# Patient Record
Sex: FEMALE | Race: WHITE | Employment: OTHER | URBAN - METROPOLITAN AREA
[De-identification: names, ages, dates, MRNs, and addresses within clinical notes are randomized per-mention and may not be internally consistent; named-entity substitution may affect disease eponyms.]

---

## 2022-03-18 PROBLEM — G47.33 OSA (OBSTRUCTIVE SLEEP APNEA): Status: ACTIVE | Noted: 2020-09-21

## 2022-03-18 PROBLEM — E78.49 OTHER HYPERLIPIDEMIA: Status: ACTIVE | Noted: 2020-09-21

## 2022-03-19 PROBLEM — R06.02 SOB (SHORTNESS OF BREATH): Status: ACTIVE | Noted: 2020-08-19

## 2022-03-19 PROBLEM — Z82.49 FAMILY HISTORY OF PREMATURE CAD: Status: ACTIVE | Noted: 2020-09-21

## 2022-03-19 PROBLEM — E66.01 SEVERE OBESITY (HCC): Status: ACTIVE | Noted: 2019-01-28

## 2023-06-27 ENCOUNTER — TELEPHONE (OUTPATIENT)
Age: 78
End: 2023-06-27

## 2023-06-27 NOTE — TELEPHONE ENCOUNTER
Patient states she thought a MRI/ CT order was going to be entered for her after her visit on 6/22/23, and that she just wanted to know if it is still needed. Patient can be reached at [phone].

## 2023-06-27 NOTE — TELEPHONE ENCOUNTER
LVM for the patient letting her know that insurance wants the PT completed before any type of imaging order is signed to complete.

## 2023-07-13 ENCOUNTER — HOSPITAL ENCOUNTER (OUTPATIENT)
Facility: HOSPITAL | Age: 78
Setting detail: RECURRING SERIES
Discharge: HOME | End: 2023-07-16
Payer: MEDICARE

## 2023-07-13 PROCEDURE — 97110 THERAPEUTIC EXERCISES: CPT

## 2023-07-13 PROCEDURE — 97162 PT EVAL MOD COMPLEX 30 MIN: CPT

## 2023-07-24 ENCOUNTER — TRANSCRIBE ORDERS (OUTPATIENT)
Facility: HOSPITAL | Age: 78
End: 2023-07-24

## 2023-07-24 DIAGNOSIS — Z12.31 VISIT FOR SCREENING MAMMOGRAM: Primary | ICD-10-CM

## 2023-07-26 ENCOUNTER — APPOINTMENT (OUTPATIENT)
Facility: HOSPITAL | Age: 78
End: 2023-07-26
Payer: MEDICARE

## 2023-07-28 NOTE — PROGRESS NOTES
PHYSICAL / OCCUPATIONAL THERAPY - DAILY TREATMENT NOTE (updated )    Patient Name: Tasha Hung    Date: 2023    : 1945  Insurance: Payor: MEDICARE / Plan: MEDICARE PART A AND B / Product Type: *No Product type* /      Patient  verified Yes     Visit #   Current / Total 2 24   Time   In / Out 147 239   Pain   In / Out 7 knees 7   Subjective Functional Status/Changes: I\"m doing alright. Changes to:  Meds, Allergies, Med Hx, Sx Hx? If yes, update Summary List no       TREATMENT AREA =  Other low back pain [M54.59]    OBJECTIVE    Modalities Rationale:     decrease pain to improve patient's ability to progress to PLOF and address remaining functional goals. min [] Estim Unattended, type/location:                                      []  w/ice    []  w/heat    min [] Estim Attended, type/location:                                     []  w/US     []  w/ice    []  w/heat    []  TENS insruct      min []  Mechanical Traction: type/lbs                   []  pro   []  sup   []  int   []  cont    []  before manual    []  after manual    min []  Ultrasound, settings/location:      min []  Iontophoresis w/ dexamethasone, location:                                               []  take home patch       []  in clinic   10     min  unbilled []  Ice     [x]  Heat    location/position: Supine with wedge; L/s and B knees    min []  Paraffin,  details:     min []  Vasopneumatic Device, press/temp:     min []  Addi Eladio / Ivana Pritchett: If using vaso (only need to measure limb vaso being performed on)      pre-treatment girth :       post-treatment girth :       measured at (landmark location) :      min []  Other:    Skin assessment post-treatment (if applicable):    []  intact    []  redness- no adverse reaction                 []redness - adverse reaction:         Therapeutic Procedures:   Tx Min Billable or 1:1 Min (if diff from Tx Min) Procedure, Rationale, Specifics   [id number] Therapeutic Exercise

## 2023-07-31 ENCOUNTER — HOSPITAL ENCOUNTER (OUTPATIENT)
Facility: HOSPITAL | Age: 78
Setting detail: RECURRING SERIES
Discharge: HOME | End: 2023-08-03
Payer: MEDICARE

## 2023-07-31 PROCEDURE — 97110 THERAPEUTIC EXERCISES: CPT

## 2023-07-31 PROCEDURE — 97112 NEUROMUSCULAR REEDUCATION: CPT

## 2023-08-02 ENCOUNTER — HOSPITAL ENCOUNTER (OUTPATIENT)
Facility: HOSPITAL | Age: 78
Setting detail: RECURRING SERIES
End: 2023-08-02
Payer: MEDICARE

## 2023-08-02 ENCOUNTER — TELEPHONE (OUTPATIENT)
Facility: HOSPITAL | Age: 78
End: 2023-08-02

## 2023-08-07 ENCOUNTER — HOSPITAL ENCOUNTER (OUTPATIENT)
Facility: HOSPITAL | Age: 78
Setting detail: RECURRING SERIES
Discharge: HOME | End: 2023-08-10
Payer: MEDICARE

## 2023-08-07 PROCEDURE — 97112 NEUROMUSCULAR REEDUCATION: CPT

## 2023-08-07 PROCEDURE — 97110 THERAPEUTIC EXERCISES: CPT

## 2023-08-07 NOTE — PROGRESS NOTES
97140 Therapeutic Exercise (timed):  increase ROM, strength, coordination, balance, and proprioception to improve patient's ability to progress to PLOF and address remaining functional goals. (see flow sheet as applicable)     Details if applicable:       8  45869 Neuromuscular Re-Education (timed):  improve balance, coordination, kinesthetic sense, posture, core stability and proprioception to improve patient's ability to develop conscious control of individual muscles and awareness of position of extremities in order to progress to PLOF and address remaining functional goals. (see flow sheet as applicable)     Details if applicable:            Details if applicable:            Details if applicable:            Details if applicable:     39  [facility] Totals Reminder: bill using total billable min of TIMED therapeutic procedures (example: do not include dry needle or estim unattended, both untimed codes, in totals to left)  8-22 min = 1 unit; 23-37 min = 2 units; 38-52 min = 3 units; 53-67 min = 4 units; 68-82 min = 5 units   Total Total     TOTAL TREATMENT TIME:        55     [x]  Patient Education billed concurrently with other procedures   [x] Review HEP    [] Progressed/Changed HEP, detail:    [] Other detail:       Objective Information/Functional Measures/Assessment  Minor cramping in the B HS's with supine marching that eased with a rest break. The pt was able to perform therex as prescribed but does note an increase in L/s pain post treatment. The pt left in no apparent distress.       Patient will continue to benefit from skilled PT / OT services to modify and progress therapeutic interventions, analyze and address functional mobility deficits, analyze and address ROM deficits, analyze and address strength deficits, analyze and address soft tissue restrictions, analyze and cue for proper movement patterns, and analyze and modify for postural abnormalities to address functional deficits and attain remaining

## 2023-08-09 ENCOUNTER — APPOINTMENT (OUTPATIENT)
Facility: HOSPITAL | Age: 78
End: 2023-08-09
Payer: MEDICARE

## 2023-08-09 ENCOUNTER — TELEPHONE (OUTPATIENT)
Facility: HOSPITAL | Age: 78
End: 2023-08-09

## 2023-08-14 ENCOUNTER — HOSPITAL ENCOUNTER (OUTPATIENT)
Facility: HOSPITAL | Age: 78
Setting detail: RECURRING SERIES
Discharge: HOME | End: 2023-08-17
Payer: MEDICARE

## 2023-08-14 PROCEDURE — 97110 THERAPEUTIC EXERCISES: CPT

## 2023-08-14 PROCEDURE — 97112 NEUROMUSCULAR REEDUCATION: CPT

## 2023-08-14 NOTE — PROGRESS NOTES
PHYSICAL / OCCUPATIONAL THERAPY - DAILY TREATMENT NOTE (updated )    Patient Name: Halle Rivas    Date: 2023    : 1945  Insurance: Payor: MEDICARE / Plan: MEDICARE PART A AND B / Product Type: *No Product type* /      Patient  verified Yes     Visit #   Current / Total 4 24   Time   In / Out 12:32 1:20   Pain   In / Out 5-6 7   Subjective Functional Status/Changes: Pt reports no changes   Changes to: Allergies, Med Hx, Sx Hx?   no       TREATMENT AREA =  Other low back pain [M54.59]    OBJECTIVE    Modalities Rationale:     decrease pain and increase tissue extensibility to improve patient's ability to progress to PLOF and address remaining functional goals. min [] Estim Unattended, type/location:                                      []  w/ice    []  w/heat    min [] Estim Attended, type/location:                                     []  w/US     []  w/ice    []  w/heat    []  TENS insruct      min []  Mechanical Traction: type/lbs                   []  pro   []  sup   []  int   []  cont    []  before manual    []  after manual    min []  Ultrasound, settings/location:      min []  Iontophoresis w/ dexamethasone, location:                                               []  take home patch       []  in clinic     10   min  unbilled []  Ice     [x]  Heat    location/position: Supine w/wedge  Lumbar, B knees    min []  Paraffin,  details:     min []  Vasopneumatic Device, press/temp:     min []  Kayce Burner / Nena Hammond: If using vaso (only need to measure limb vaso being performed on)      pre-treatment girth :       post-treatment girth :       measured at (landmark location) :      min []  Other:    Skin assessment post-treatment (if applicable):    [x]  intact    [x]  redness- no adverse reaction                 []redness - adverse reaction:         Therapeutic Procedures:   Tx Min Billable or 1:1 Min (if diff from Tx Min) Procedure, Rationale, Specifics   28  07263 Therapeutic Exercise

## 2023-08-16 ENCOUNTER — HOSPITAL ENCOUNTER (OUTPATIENT)
Facility: HOSPITAL | Age: 78
Setting detail: RECURRING SERIES
Discharge: HOME | End: 2023-08-19
Payer: MEDICARE

## 2023-08-16 PROCEDURE — 97110 THERAPEUTIC EXERCISES: CPT

## 2023-08-16 PROCEDURE — 97530 THERAPEUTIC ACTIVITIES: CPT

## 2023-08-16 NOTE — PROGRESS NOTES
knee  PN: right knee flex and ext 4+/5, left knee flex 4-/5, Ext 4/5    The patient will improve FOTO score to 51 to indicate improved function with ADL's. IE: FOTO score = 40  PN: progressing FOTO score 47  2. Patient's lower back pain  level will go down to 1/10 as demonstrated with negative SLR test so patient can perform the functional tasks at home with less amounts of pain. IE: back pain with activity 8/10  PN: no pain with SLR however pt notes about a 7-8/10 with activity. 3. The patient would be able to ambulate 300 feet with either LRAD or no devise with normal dorina, equal weight shifts and correct gait pattern in order to return back to her PLOF and abilities. IE: Able to ambulate shorter distances only with a slow dorina and unequal weight shifts  PN: slowly progressing pt ambulating with no AD but notes increased B knee pain (left> right) with prolonged ambulation. Unequal weight shifts remain. 4.Patients's B/L knees pain will go down to 1/10 as demonstrated by the negative Apley compression test so pt can perform the functional mobility tasks with ease. IE: Knee pain with activity 8/10  PN: no pain with Apley test Bilat, but 7-8/10 pain with activity. Summary of Care/ Key Functional Changes:   Pt reports about a 50% improvement in her L/S and B knees since SOC. The pt reports improved overall functional mobility and notes and displays increased B knee strength. Little change in L/S and B knee (left>right) pain as well as little improvement in standing and ambulation tolerance. The pt reports the pain in her left knee is constant and the pain in her right knee increases with compensation for the left as the pain increases. Pt reports her L/S pain increases with activity and gets severe quickly.  The pt is to benefit from continued treatment to improve B knee strength and stability and to aid increased L/S strength and stability for improved discomfort and ease with
4+/5, left knee flex 4-/5, Ext 4/5  Long Term Goals: To be accomplished in 12 weeks  The patient will improve FOTO score to 51 to indicate improved function with ADL's. IE: FOTO score = 40  Current: progressing 8/16/23 FOTO score 47%  2. Patient's lower back pain  level will go down to 1/10 as demonstrated with negative SLR test so patient can perform the functional tasks at home with less amounts of pain. IE: back pain with activity 8/10  Current: 8/16/23 no pain with SLR however pt notes about a 7-8/10 with activity. 3. The patient would be able to ambulate 300 feet with either LRAD or no devise with normal doirna, equal weight shifts and correct gait pattern in order to return back to her PLOF and abilities. IE: Able to ambulate shorter distances only with a slow dorina and unequal weight shifts  Current: slowly progressing 8/16/23 pt ambulating with no AD but notes increased B knee pain (left> right) with prolonged ambulation. Unequal weight shifts remain. 4.Patients's B/L knees pain will go down to 1/10 as demonstrated by the negative Apley compression test so pt can perform the functional mobility tasks with ease. IE: Knee pain with activity 8/10  Current: 8/16/23 no pain with Apley test Bilat, but 7-8/10 pain with activity.       PLAN  Yes  Continue plan of care  []  Upgrade activities as tolerated  []  Discharge due to :  []  Other:    Vanqualyn Fall, PTA    8/16/2023    11:52 AM    Future Appointments   Date Time Provider [address] [place] [address]   8/23/2023 12:00 PM [facility]D [facility]   9/5/2023 11:30 AM Gabriel Green MD [facility] BS AMB   12/4/2023 11:00 AM Ke Nick MD [facility] BS AMB

## 2023-08-23 ENCOUNTER — HOSPITAL ENCOUNTER (OUTPATIENT)
Facility: HOSPITAL | Age: 78
Discharge: HOME | End: 2023-08-26
Payer: MEDICARE

## 2023-08-23 ENCOUNTER — HOSPITAL ENCOUNTER (OUTPATIENT)
Facility: HOSPITAL | Age: 78
Setting detail: RECURRING SERIES
Discharge: HOME | End: 2023-08-26
Payer: MEDICARE

## 2023-08-23 DIAGNOSIS — Z12.31 VISIT FOR SCREENING MAMMOGRAM: ICD-10-CM

## 2023-08-23 PROCEDURE — 97530 THERAPEUTIC ACTIVITIES: CPT

## 2023-08-23 PROCEDURE — 97110 THERAPEUTIC EXERCISES: CPT

## 2023-08-23 PROCEDURE — 77063 BREAST TOMOSYNTHESIS BI: CPT

## 2023-08-23 NOTE — PROGRESS NOTES
tardiness. Patient will continue to benefit from skilled PT / OT services to modify and progress therapeutic interventions, analyze and address functional mobility deficits, analyze and address ROM deficits, analyze and address strength deficits, analyze and address soft tissue restrictions, and analyze and cue for proper movement patterns to address functional deficits and attain remaining goals. Progress toward goals / Updated goals:  []  See Progress Note/Recertification    Goals/Measure of Progress: To be achieved in 8 weeks: The patient will be independent and compliant with the HEP in order to maximize the therapeutic benefit of exercises. IE: HEP given and demonstrated  PN: Met HEP understanding and compliance reported. 2. The patient will demonstrate improvements in the left knee flexion ROM to 110 degrees in order to increase ease with all functional transfers. IE: left knee flexion = 100 degrees  PN:  degrees left knee AROM  3. The patient will improve strength in both knees to 4+ in order to be able to perform stairs with less difficulty. IE: Knee strength 4/5 in right knee and 4-/5 in left knee  PN: right knee flex and ext 4+/5, left knee flex 4-/5, Ext 4/5     The patient will improve FOTO score to 51 to indicate improved function with ADL's. IE: FOTO score = 40  PN: progressing FOTO score 47  2. Patient's lower back pain  level will go down to 1/10 as demonstrated with negative SLR test so patient can perform the functional tasks at home with less amounts of pain. IE: back pain with activity 8/10  PN: no pain with SLR however pt notes about a 7-8/10 with activity. 3. The patient would be able to ambulate 300 feet with either LRAD or no devise with normal dorina, equal weight shifts and correct gait pattern in order to return back to her PLOF and abilities.   IE: Able to ambulate shorter distances only with a slow dorina and unequal weight shifts  PN: slowly progressing pt

## 2023-08-29 ENCOUNTER — APPOINTMENT (OUTPATIENT)
Facility: HOSPITAL | Age: 78
End: 2023-08-29
Payer: MEDICARE

## 2023-08-29 NOTE — PROGRESS NOTES
[facility]  [address] [place] [address] Dr  Phone: [phone]  Fax: [phone]      Niurka Mccollum  : 1945  PCP: Emile Enciso MD  2023    PROGRESS NOTE    HISTORY OF PRESENT ILLNESS    23  Referred by Dr. Paul Macias with suspicion of back complications causing her c/o bilateral knee pain (L>R)  She last saw Dr. Paul Macias on 23 for completion of gel injection series for bilateral knees  C/o constant pain in her knees, including while sitting. She cannot wash dishes due to the extreme pain. She reports an injury a couple years ago that resulted in a leg fracture, and when she was referred to PT, her insurance rejected it. PLAN: Referral to PT ([facility]) w/ dry needling and pilates modalities      Jyotsna Laura Silvia Pressley is a 69 y.o. female was seen today for follow up. She attended PT (23-23) for her lower back pain with minimal benefit. Pt previously had left knee pain. However, she notes of new symptom where her pain seems to have transferred to her left calf and sides of her shin, after she started ambulating with a cane. She underwent. She notes she is thinking of seeing Dr. Paul Macias again for cortisone injections for her bilateral knee pain. She previously took Gabapentin when pt had shingles. Notes an episode where she fell on her knees hard about 3 years ago, and worried that she may have fractured something that never healed properly. Pt notes she has been under a lot of personal stress as well, as her sister is in hospice care. Pain Score: 8/10     PmHx: DM     reviewed. REVIEW OF SYSTEMS  Review of Systems   Constitutional:  Negative for fever and unexpected weight change. HENT:  Negative for trouble swallowing. Eyes:  Negative for visual disturbance. Respiratory:  Negative for shortness of breath and wheezing. Gastrointestinal:  Negative for nausea and vomiting.    Genitourinary:  Negative for

## 2023-08-31 ENCOUNTER — HOSPITAL ENCOUNTER (OUTPATIENT)
Facility: HOSPITAL | Age: 78
Setting detail: RECURRING SERIES
End: 2023-08-31
Payer: MEDICARE

## 2023-08-31 PROCEDURE — 97110 THERAPEUTIC EXERCISES: CPT

## 2023-08-31 PROCEDURE — 97112 NEUROMUSCULAR REEDUCATION: CPT

## 2023-08-31 NOTE — PROGRESS NOTES
PHYSICAL / OCCUPATIONAL THERAPY - DAILY TREATMENT NOTE (updated )    Patient Name: Cass Brown    Date: 2023    : 1945  Insurance: Payor: MEDICARE / Plan: MEDICARE PART A AND B / Product Type: *No Product type* /      Patient  verified Yes     Visit #   Current / Total 7 24   Time   In / Out 1159 1230   Pain   In / Out 5 7   Subjective Functional Status/Changes: No new complaints. Patient arrived 10 minutes late again. Changes to:  Meds, Allergies, Med Hx, Sx Hx? If yes, update Summary List no       TREATMENT AREA =  Other low back pain [M54.59]    OBJECTIVE    Therapeutic Procedures: Tx Min Billable or 1:1 Min (if diff from Tx Min) Procedure, Rationale, Specifics   23  54553 Therapeutic Exercise (timed):  increase ROM, strength, coordination, balance, and proprioception to improve patient's ability to progress to PLOF and address remaining functional goals. (see flow sheet as applicable)     Details if applicable:       8  31117 Neuromuscular Re-Education (timed):  improve balance, coordination, kinesthetic sense, posture, core stability and proprioception to improve patient's ability to develop conscious control of individual muscles and awareness of position of extremities in order to progress to PLOF and address remaining functional goals.  (see flow sheet as applicable)     Details if applicable:            Details if applicable:            Details if applicable:            Details if applicable:     32  [facility] Totals Reminder: bill using total billable min of TIMED therapeutic procedures (example: do not include dry needle or estim unattended, both untimed codes, in totals to left)  8-22 min = 1 unit; 23-37 min = 2 units; 38-52 min = 3 units; 53-67 min = 4 units; 68-82 min = 5 units   Total Total     TOTAL TREATMENT TIME:        31     [x]  Patient Education billed concurrently with other procedures   [x] Review HEP    [] Progressed/Changed HEP, detail:    [] Other detail: pattern in order to return back to her PLOF and abilities. IE: Able to ambulate shorter distances only with a slow dorina and unequal weight shifts  PN: slowly progressing pt ambulating with no AD but notes increased B knee pain (left> right) with prolonged ambulation. Unequal weight shifts remain. 4.Patients's B/L knees pain will go down to 1/10 as demonstrated by the negative Apley compression test so pt can perform the functional mobility tasks with ease. IE: Knee pain with activity 8/10  PN: no pain with Apley test Bilat, but 7-8/10 pain with activity.     PLAN  Yes  Continue plan of care  []  Upgrade activities as tolerated  []  Discharge due to :  []  Other:    Savanah Briggs, PT, CMTPT    8/31/2023    9:35 AM    Future Appointments   Date Time Provider [address]   8/31/2023 11:50 AM [facility]   9/5/2023 11:30 AM Gail Huynh MD [facility] BS AMB   9/5/2023  1:10 PM Vanessa George, PT [facility]   9/7/2023 11:10 AM [facility] Greene[facility]   9/11/2023 11:10 AM Katey Yee, PTA [facility]   9/13/2023 11:10 AM Katey Yee, [facility]   9/18/2023 11:10 AM Liv Glez, [facility]   12/4/2023 11:00 AM Mya Briones MD [facility] BS AMB

## 2023-09-05 ENCOUNTER — APPOINTMENT (OUTPATIENT)
Facility: HOSPITAL | Age: 78
End: 2023-09-05
Payer: MEDICARE

## 2023-09-05 ENCOUNTER — OFFICE VISIT (OUTPATIENT)
Age: 78
End: 2023-09-05
Payer: MEDICARE

## 2023-09-05 VITALS
DIASTOLIC BLOOD PRESSURE: 72 MMHG | BODY MASS INDEX: 35.52 KG/M2 | WEIGHT: 221 LBS | TEMPERATURE: 97.4 F | SYSTOLIC BLOOD PRESSURE: 120 MMHG | OXYGEN SATURATION: 95 % | HEIGHT: 66 IN | HEART RATE: 78 BPM

## 2023-09-05 DIAGNOSIS — M54.50 LUMBAR PAIN: Primary | ICD-10-CM

## 2023-09-05 DIAGNOSIS — M25.562 CHRONIC PAIN OF LEFT KNEE: ICD-10-CM

## 2023-09-05 DIAGNOSIS — M54.16 LUMBAR RADICULOPATHY: ICD-10-CM

## 2023-09-05 DIAGNOSIS — G89.29 CHRONIC PAIN OF LEFT KNEE: ICD-10-CM

## 2023-09-05 DIAGNOSIS — M17.12 PRIMARY OSTEOARTHRITIS OF LEFT KNEE: ICD-10-CM

## 2023-09-05 DIAGNOSIS — M79.18 MYOFASCIAL PAIN: ICD-10-CM

## 2023-09-05 DIAGNOSIS — M54.16 LUMBAR NEURITIS: ICD-10-CM

## 2023-09-05 PROCEDURE — 99214 OFFICE O/P EST MOD 30 MIN: CPT | Performed by: PHYSICAL MEDICINE & REHABILITATION

## 2023-09-05 PROCEDURE — 1123F ACP DISCUSS/DSCN MKR DOCD: CPT | Performed by: PHYSICAL MEDICINE & REHABILITATION

## 2023-09-05 PROCEDURE — 3078F DIAST BP <80 MM HG: CPT | Performed by: PHYSICAL MEDICINE & REHABILITATION

## 2023-09-05 PROCEDURE — G8427 DOCREV CUR MEDS BY ELIG CLIN: HCPCS | Performed by: PHYSICAL MEDICINE & REHABILITATION

## 2023-09-05 PROCEDURE — 3074F SYST BP LT 130 MM HG: CPT | Performed by: PHYSICAL MEDICINE & REHABILITATION

## 2023-09-05 PROCEDURE — 1036F TOBACCO NON-USER: CPT | Performed by: PHYSICAL MEDICINE & REHABILITATION

## 2023-09-05 PROCEDURE — G8417 CALC BMI ABV UP PARAM F/U: HCPCS | Performed by: PHYSICAL MEDICINE & REHABILITATION

## 2023-09-05 PROCEDURE — 1090F PRES/ABSN URINE INCON ASSESS: CPT | Performed by: PHYSICAL MEDICINE & REHABILITATION

## 2023-09-05 PROCEDURE — G8399 PT W/DXA RESULTS DOCUMENT: HCPCS | Performed by: PHYSICAL MEDICINE & REHABILITATION

## 2023-09-05 RX ORDER — LOSARTAN POTASSIUM 25 MG/1
25 TABLET ORAL DAILY
COMMUNITY
Start: 2023-08-03

## 2023-09-05 RX ORDER — PREGABALIN 150 MG/1
150 CAPSULE ORAL 2 TIMES DAILY
Qty: 60 CAPSULE | Refills: 2 | Status: SHIPPED | OUTPATIENT
Start: 2023-09-05 | End: 2023-09-08 | Stop reason: SDUPTHER

## 2023-09-05 RX ORDER — CHLORTHALIDONE 25 MG/1
TABLET ORAL
COMMUNITY

## 2023-09-05 RX ORDER — LEVOTHYROXINE SODIUM 0.05 MG/1
TABLET ORAL
COMMUNITY
Start: 2023-08-17

## 2023-09-05 RX ORDER — NEBIVOLOL 5 MG/1
5 TABLET ORAL DAILY
COMMUNITY
Start: 2023-07-12

## 2023-09-05 ASSESSMENT — ENCOUNTER SYMPTOMS
TROUBLE SWALLOWING: 0
SHORTNESS OF BREATH: 0
NAUSEA: 0
WHEEZING: 0
BACK PAIN: 1
VOMITING: 0

## 2023-09-05 NOTE — PROGRESS NOTES
Jennifer Villalobos presents today for   Chief Complaint   Patient presents with    Back Pain    Leg Pain     Left         Is someone accompanying this pt? no    Is the patient using any DME equipment during OV? Yes, cane     Depression Screening:  PHQ-9 Questionaire 12/5/2022 12/6/2021 9/27/2021 7/12/2021 6/24/2021 4/12/2021 3/25/2021   Little interest or pleasure in doing things 0 0 0 0 0 0 0   Feeling down, depressed, or hopeless 0 0 0 0 0 0 0   PHQ-9 Total Score 0 0 0 0 0 0 0     PHQ Scores 12/5/2022 12/6/2021 9/27/2021 7/12/2021 6/24/2021 4/12/2021 3/25/2021   PHQ2 Score 0 0 0 0 0 0 0   PHQ2 Score - 0 0 0 0 0 0   PHQ9 Score 0 0 0 0 0 0 0         Coordination of Care:  1. Have you been to the ER, urgent care clinic since your last visit? no  Hospitalized since your last visit? no    2. Have you seen or consulted any other health care providers outside of the [address] [place] [address] since your last visit? Yes, pcp  Include any pap smears or colon screening.  no

## 2023-09-07 ENCOUNTER — APPOINTMENT (OUTPATIENT)
Facility: HOSPITAL | Age: 78
End: 2023-09-07
Payer: MEDICARE

## 2023-09-08 DIAGNOSIS — M54.50 LUMBAR PAIN: ICD-10-CM

## 2023-09-08 DIAGNOSIS — M17.12 PRIMARY OSTEOARTHRITIS OF LEFT KNEE: ICD-10-CM

## 2023-09-08 DIAGNOSIS — M79.18 MYOFASCIAL PAIN: ICD-10-CM

## 2023-09-08 DIAGNOSIS — M54.16 LUMBAR RADICULOPATHY: ICD-10-CM

## 2023-09-08 DIAGNOSIS — G89.29 CHRONIC PAIN OF LEFT KNEE: ICD-10-CM

## 2023-09-08 DIAGNOSIS — M25.562 CHRONIC PAIN OF LEFT KNEE: ICD-10-CM

## 2023-09-08 DIAGNOSIS — M54.16 LUMBAR NEURITIS: ICD-10-CM

## 2023-09-08 RX ORDER — PREGABALIN 150 MG/1
150 CAPSULE ORAL 2 TIMES DAILY
Qty: 60 CAPSULE | Refills: 2 | Status: SHIPPED | OUTPATIENT
Start: 2023-09-08 | End: 2023-12-07

## 2023-09-08 NOTE — TELEPHONE ENCOUNTER
Patient states her pharmacy ([facility] [address]) is out of pregabalin, and is asking for her prescription to be sent to the [facility] pharmacy on [address].    Patient can be reached at [phone]

## 2023-09-11 ENCOUNTER — HOSPITAL ENCOUNTER (OUTPATIENT)
Facility: HOSPITAL | Age: 78
Setting detail: RECURRING SERIES
Discharge: HOME | End: 2023-09-14
Payer: MEDICARE

## 2023-09-11 PROCEDURE — 97110 THERAPEUTIC EXERCISES: CPT

## 2023-09-11 PROCEDURE — 97112 NEUROMUSCULAR REEDUCATION: CPT

## 2023-09-11 NOTE — PROGRESS NOTES
PHYSICAL / OCCUPATIONAL THERAPY - DAILY TREATMENT NOTE (updated )    Patient Name: Taj Vazquez    Date: 2023    : 1945  Insurance: Payor: MEDICARE / Plan: MEDICARE PART A AND B / Product Type: *No Product type* /      Patient  verified Yes     Visit #   Current / Total 8 24   Time   In / Out 1122 1205   Pain   In / Out 6 5   Subjective Functional Status/Changes: Pt reports she has been put on a new med and today (23) is her third day taking it. Pt states it helps her sleep through the night but she feels really groggy and sluggish in the mornings. Changes to: Allergies, Med Hx, Sx Hx? yes       TREATMENT AREA =  Other low back pain [M54.59]    OBJECTIVE    Modalities Rationale:     decrease pain and increase tissue extensibility to improve patient's ability to progress to PLOF and address remaining functional goals. min [] Estim Unattended, type/location:                                      []  w/ice    []  w/heat    min [] Estim Attended, type/location:                                     []  w/US     []  w/ice    []  w/heat    []  TENS insruct      min []  Mechanical Traction: type/lbs                   []  pro   []  sup   []  int   []  cont    []  before manual    []  after manual    min []  Ultrasound, settings/location:      min []  Iontophoresis w/ dexamethasone, location:                                               []  take home patch       []  in clinic     10   min  unbilled []  Ice     [x]  Heat    location/position: Seated, L/S, C/S    min []  Paraffin,  details:     min []  Vasopneumatic Device, press/temp:     min []  Ana Luisa Jewell / Jeni Aguilar:     If using vaso (only need to measure limb vaso being performed on)      pre-treatment girth :       post-treatment girth :       measured at (landmark location) :      min []  Other:    Skin assessment post-treatment (if applicable):    [x]  intact    []  redness- no adverse reaction                 []redness - adverse

## 2023-09-13 ENCOUNTER — APPOINTMENT (OUTPATIENT)
Facility: HOSPITAL | Age: 78
End: 2023-09-13
Payer: MEDICARE

## 2023-09-15 ENCOUNTER — TRANSCRIBE ORDERS (OUTPATIENT)
Facility: HOSPITAL | Age: 78
End: 2023-09-15

## 2023-09-15 DIAGNOSIS — D72.820 LYMPHOCYTOSIS: Primary | ICD-10-CM

## 2023-09-15 DIAGNOSIS — R92.8 ABNORMAL MAMMOGRAM: Primary | ICD-10-CM

## 2023-09-18 ENCOUNTER — APPOINTMENT (OUTPATIENT)
Facility: HOSPITAL | Age: 78
End: 2023-09-18
Payer: MEDICARE

## 2023-09-21 ENCOUNTER — HOSPITAL ENCOUNTER (OUTPATIENT)
Facility: HOSPITAL | Age: 78
Discharge: HOME | End: 2023-09-21
Attending: INTERNAL MEDICINE
Payer: MEDICARE

## 2023-09-21 ENCOUNTER — HOSPITAL ENCOUNTER (OUTPATIENT)
Facility: HOSPITAL | Age: 78
End: 2023-09-21
Attending: INTERNAL MEDICINE
Payer: MEDICARE

## 2023-09-21 DIAGNOSIS — R92.8 ABNORMAL MAMMOGRAM: ICD-10-CM

## 2023-09-21 PROCEDURE — G0279 TOMOSYNTHESIS, MAMMO: HCPCS

## 2023-10-16 ENCOUNTER — HOSPITAL ENCOUNTER (OUTPATIENT)
Facility: HOSPITAL | Age: 78
Discharge: HOME | End: 2023-10-19
Attending: PHYSICAL MEDICINE & REHABILITATION
Payer: MEDICARE

## 2023-10-16 DIAGNOSIS — M25.562 CHRONIC PAIN OF LEFT KNEE: ICD-10-CM

## 2023-10-16 DIAGNOSIS — M79.18 MYOFASCIAL PAIN: ICD-10-CM

## 2023-10-16 DIAGNOSIS — M54.50 LUMBAR PAIN: ICD-10-CM

## 2023-10-16 DIAGNOSIS — M54.16 LUMBAR RADICULOPATHY: ICD-10-CM

## 2023-10-16 DIAGNOSIS — G89.29 CHRONIC PAIN OF LEFT KNEE: ICD-10-CM

## 2023-10-16 DIAGNOSIS — M54.16 LUMBAR NEURITIS: ICD-10-CM

## 2023-10-16 PROCEDURE — 72148 MRI LUMBAR SPINE W/O DYE: CPT

## 2023-10-27 NOTE — PROGRESS NOTES
[facility]  [address]  [facility], [place], [address]  Phone: [phone]  Fax: [phone]      Mikey Anguiano  : 1945  PCP: Divya Lyman MD  2023    PROGRESS NOTE    HISTORY OF PRESENT ILLNESS    23  Referred by Dr. Yael Doshi with suspicion of back complications causing her c/o bilateral knee pain (L>R)  She last saw Dr. Yael Doshi on 23 for completion of gel injection series for bilateral knees  C/o constant pain in her knees, including while sitting. She cannot wash dishes due to the extreme pain. She reports an injury a couple years ago that resulted in a leg fracture, and when she was referred to PT, her insurance rejected it. PLAN: Referral to PT ([facility]) w/ dry needling and pilates modalities    23  PT (23-23) attended for her lower back pain with minimal benefit. Pt previously had left knee pain. However, she notes of new symptom where her pain seems to have transferred to her left calf and sides of her shin, after she started ambulating with a cane. She underwent. She previously took Gabapentin when pt had shingles. Notes an episode where she fell on her knees hard about 3 years ago, and worried that she may have fractured something that never healed properly. Pt notes she has been under a lot of personal stress as well, because her sister is in hospice care. PLAN: Lumbar MRI, Lyrica 150 mg BID      Mackenzie Mcintyre is a 69 y.o. female was seen today for follow up. She continues with lumbar pain, and feels that it has worsened. Describes pain as a stabbing sensation. She attributes some of her lumbar pain to her left knee pain. She previously saw Dr. Yael Doshi in 2023 for left knee injections with benefit. Lumbar images dated 10/16/23 were reviewed. Per report, no definite of transitional L5 vertebra with left-sided sacral assimilation joint.  Severe facet arthrosis L4-5 resulting in grade 1

## 2023-11-08 ENCOUNTER — OFFICE VISIT (OUTPATIENT)
Age: 78
End: 2023-11-08
Payer: MEDICARE

## 2023-11-08 VITALS
WEIGHT: 224 LBS | TEMPERATURE: 97.7 F | BODY MASS INDEX: 36 KG/M2 | DIASTOLIC BLOOD PRESSURE: 69 MMHG | OXYGEN SATURATION: 95 % | HEIGHT: 66 IN | HEART RATE: 68 BPM | SYSTOLIC BLOOD PRESSURE: 125 MMHG

## 2023-11-08 DIAGNOSIS — M54.50 LUMBAR PAIN: ICD-10-CM

## 2023-11-08 DIAGNOSIS — M17.0 PRIMARY OSTEOARTHRITIS OF BOTH KNEES: ICD-10-CM

## 2023-11-08 DIAGNOSIS — M47.816 LUMBAR FACET ARTHROPATHY: Primary | ICD-10-CM

## 2023-11-08 PROCEDURE — G8484 FLU IMMUNIZE NO ADMIN: HCPCS | Performed by: PHYSICAL MEDICINE & REHABILITATION

## 2023-11-08 PROCEDURE — 3078F DIAST BP <80 MM HG: CPT | Performed by: PHYSICAL MEDICINE & REHABILITATION

## 2023-11-08 PROCEDURE — G8399 PT W/DXA RESULTS DOCUMENT: HCPCS | Performed by: PHYSICAL MEDICINE & REHABILITATION

## 2023-11-08 PROCEDURE — 1036F TOBACCO NON-USER: CPT | Performed by: PHYSICAL MEDICINE & REHABILITATION

## 2023-11-08 PROCEDURE — G8417 CALC BMI ABV UP PARAM F/U: HCPCS | Performed by: PHYSICAL MEDICINE & REHABILITATION

## 2023-11-08 PROCEDURE — G8427 DOCREV CUR MEDS BY ELIG CLIN: HCPCS | Performed by: PHYSICAL MEDICINE & REHABILITATION

## 2023-11-08 PROCEDURE — 3074F SYST BP LT 130 MM HG: CPT | Performed by: PHYSICAL MEDICINE & REHABILITATION

## 2023-11-08 PROCEDURE — 1123F ACP DISCUSS/DSCN MKR DOCD: CPT | Performed by: PHYSICAL MEDICINE & REHABILITATION

## 2023-11-08 PROCEDURE — 99213 OFFICE O/P EST LOW 20 MIN: CPT | Performed by: PHYSICAL MEDICINE & REHABILITATION

## 2023-11-08 PROCEDURE — 1090F PRES/ABSN URINE INCON ASSESS: CPT | Performed by: PHYSICAL MEDICINE & REHABILITATION

## 2023-11-08 ASSESSMENT — ENCOUNTER SYMPTOMS
WHEEZING: 0
SHORTNESS OF BREATH: 0
TROUBLE SWALLOWING: 0
VOMITING: 0
BACK PAIN: 1
NAUSEA: 0

## 2023-11-08 NOTE — PROGRESS NOTES
Mónica Cross presents today for   Chief Complaint   Patient presents with    Lower Back Pain    Leg Pain     Bilateral        Is someone accompanying this pt? no    Is the patient using any DME equipment during OV? no      Coordination of Care:  1. Have you been to the ER, urgent care clinic since your last visit? no  Hospitalized since your last visit? no    2. Have you seen or consulted any other health care providers outside of the [address] [place] [address] since your last visit? no Include any pap smears or colon screening.  no never

## 2023-11-08 NOTE — PATIENT INSTRUCTIONS
James Shirley's Big three exercises link:  [email or web address]

## 2023-11-30 ENCOUNTER — OFFICE VISIT (OUTPATIENT)
Age: 78
End: 2023-11-30

## 2023-11-30 DIAGNOSIS — G89.29 CHRONIC PAIN OF LEFT KNEE: ICD-10-CM

## 2023-11-30 DIAGNOSIS — G89.29 CHRONIC PAIN OF RIGHT KNEE: ICD-10-CM

## 2023-11-30 DIAGNOSIS — M17.11 UNILATERAL PRIMARY OSTEOARTHRITIS, RIGHT KNEE: ICD-10-CM

## 2023-11-30 DIAGNOSIS — M25.561 CHRONIC PAIN OF RIGHT KNEE: ICD-10-CM

## 2023-11-30 DIAGNOSIS — M17.12 UNILATERAL PRIMARY OSTEOARTHRITIS, LEFT KNEE: Primary | ICD-10-CM

## 2023-11-30 DIAGNOSIS — M79.10 MYALGIA: ICD-10-CM

## 2023-11-30 DIAGNOSIS — M54.50 CHRONIC BILATERAL LOW BACK PAIN, UNSPECIFIED WHETHER SCIATICA PRESENT: ICD-10-CM

## 2023-11-30 DIAGNOSIS — G89.29 CHRONIC BILATERAL LOW BACK PAIN, UNSPECIFIED WHETHER SCIATICA PRESENT: ICD-10-CM

## 2023-11-30 DIAGNOSIS — M54.50 LUMBAR PAIN: ICD-10-CM

## 2023-11-30 DIAGNOSIS — M25.562 CHRONIC PAIN OF LEFT KNEE: ICD-10-CM

## 2023-11-30 RX ORDER — BETAMETHASONE SODIUM PHOSPHATE AND BETAMETHASONE ACETATE 3; 3 MG/ML; MG/ML
3 INJECTION, SUSPENSION INTRA-ARTICULAR; INTRALESIONAL; INTRAMUSCULAR; SOFT TISSUE ONCE
Status: COMPLETED | OUTPATIENT
Start: 2023-11-30 | End: 2023-11-30

## 2023-11-30 RX ADMIN — BETAMETHASONE SODIUM PHOSPHATE AND BETAMETHASONE ACETATE 3 MG: 3; 3 INJECTION, SUSPENSION INTRA-ARTICULAR; INTRALESIONAL; INTRAMUSCULAR; SOFT TISSUE at 16:30

## 2023-11-30 RX ADMIN — BETAMETHASONE SODIUM PHOSPHATE AND BETAMETHASONE ACETATE 3 MG: 3; 3 INJECTION, SUSPENSION INTRA-ARTICULAR; INTRALESIONAL; INTRAMUSCULAR; SOFT TISSUE at 16:28

## 2023-11-30 RX ADMIN — BETAMETHASONE SODIUM PHOSPHATE AND BETAMETHASONE ACETATE 3 MG: 3; 3 INJECTION, SUSPENSION INTRA-ARTICULAR; INTRALESIONAL; INTRAMUSCULAR; SOFT TISSUE at 16:29

## 2023-11-30 NOTE — PROGRESS NOTES
obtained  Time: 4:34 PM  Date of procedure: 11/30/2023    Procedure performed by:  Dr. Cynthia Bernal    Ms. Sundeep Thakur tolerated the procedure well with no complications. IMPRESSION:      ICD-10-CM    1. Unilateral primary osteoarthritis, left knee  M17.12 Ambulatory Referral to Ortho Injection     DRAIN/INJECT LARGE JOINT/BURSA     betamethasone acetate-betamethasone sodium phosphate (CELESTONE) injection 3 mg      2. Unilateral primary osteoarthritis, right knee  M17.11 Ambulatory Referral to Ortho Injection     DRAIN/INJECT LARGE JOINT/BURSA     betamethasone acetate-betamethasone sodium phosphate (CELESTONE) injection 3 mg      3. Lumbar pain  M54.50 WA INJECTION SINGLE/MLT TRIGGER POINT 1/2 MUSCLES     betamethasone acetate-betamethasone sodium phosphate (CELESTONE) injection 3 mg      4. Myalgia  M79.10 WA INJECTION SINGLE/MLT TRIGGER POINT 1/2 MUSCLES     betamethasone acetate-betamethasone sodium phosphate (CELESTONE) injection 3 mg      5. Chronic pain of left knee  M25.562 DRAIN/INJECT LARGE JOINT/BURSA    G89.29 betamethasone acetate-betamethasone sodium phosphate (CELESTONE) injection 3 mg      6. Chronic bilateral low back pain, unspecified whether sciatica present  M54.50 WA INJECTION SINGLE/MLT TRIGGER POINT 1/2 MUSCLES    G89.29 betamethasone acetate-betamethasone sodium phosphate (CELESTONE) injection 3 mg      7. Chronic pain of right knee  M25.561 DRAIN/INJECT LARGE JOINT/BURSA    G89.29 betamethasone acetate-betamethasone sodium phosphate (CELESTONE) injection 3 mg     betamethasone acetate-betamethasone sodium phosphate (CELESTONE) injection 3 mg        PLAN:  After discussing treatment options, patient's left paralumbar muscle trigger and knees were injected with 4 cc Marcaine and 1/2 cc Celestone. Consider viscosupplementation if pain continues. Follow up PRN.     Documentation by walter Amanda, as documented by Sahra Padilla MD.

## 2023-12-04 ENCOUNTER — OFFICE VISIT (OUTPATIENT)
Age: 78
End: 2023-12-04
Payer: MEDICARE

## 2023-12-04 VITALS
HEIGHT: 66 IN | BODY MASS INDEX: 36 KG/M2 | WEIGHT: 224 LBS | HEART RATE: 56 BPM | DIASTOLIC BLOOD PRESSURE: 82 MMHG | OXYGEN SATURATION: 94 % | SYSTOLIC BLOOD PRESSURE: 146 MMHG

## 2023-12-04 DIAGNOSIS — E78.49 OTHER HYPERLIPIDEMIA: ICD-10-CM

## 2023-12-04 DIAGNOSIS — G47.33 OBSTRUCTIVE SLEEP APNEA (ADULT) (PEDIATRIC): ICD-10-CM

## 2023-12-04 DIAGNOSIS — Z79.4 TYPE 2 DIABETES MELLITUS WITHOUT COMPLICATION, WITH LONG-TERM CURRENT USE OF INSULIN (HCC): ICD-10-CM

## 2023-12-04 DIAGNOSIS — I42.9 CARDIOMYOPATHY, UNSPECIFIED TYPE (HCC): Primary | ICD-10-CM

## 2023-12-04 DIAGNOSIS — E11.9 TYPE 2 DIABETES MELLITUS WITHOUT COMPLICATION, WITH LONG-TERM CURRENT USE OF INSULIN (HCC): ICD-10-CM

## 2023-12-04 DIAGNOSIS — N18.30 STAGE 3 CHRONIC KIDNEY DISEASE, UNSPECIFIED WHETHER STAGE 3A OR 3B CKD (HCC): ICD-10-CM

## 2023-12-04 DIAGNOSIS — I10 ESSENTIAL (PRIMARY) HYPERTENSION: ICD-10-CM

## 2023-12-04 PROCEDURE — 3077F SYST BP >= 140 MM HG: CPT | Performed by: INTERNAL MEDICINE

## 2023-12-04 PROCEDURE — G8399 PT W/DXA RESULTS DOCUMENT: HCPCS | Performed by: INTERNAL MEDICINE

## 2023-12-04 PROCEDURE — 1123F ACP DISCUSS/DSCN MKR DOCD: CPT | Performed by: INTERNAL MEDICINE

## 2023-12-04 PROCEDURE — 1036F TOBACCO NON-USER: CPT | Performed by: INTERNAL MEDICINE

## 2023-12-04 PROCEDURE — 3079F DIAST BP 80-89 MM HG: CPT | Performed by: INTERNAL MEDICINE

## 2023-12-04 PROCEDURE — 93000 ELECTROCARDIOGRAM COMPLETE: CPT | Performed by: INTERNAL MEDICINE

## 2023-12-04 PROCEDURE — G8484 FLU IMMUNIZE NO ADMIN: HCPCS | Performed by: INTERNAL MEDICINE

## 2023-12-04 PROCEDURE — G8427 DOCREV CUR MEDS BY ELIG CLIN: HCPCS | Performed by: INTERNAL MEDICINE

## 2023-12-04 PROCEDURE — 1090F PRES/ABSN URINE INCON ASSESS: CPT | Performed by: INTERNAL MEDICINE

## 2023-12-04 PROCEDURE — 99214 OFFICE O/P EST MOD 30 MIN: CPT | Performed by: INTERNAL MEDICINE

## 2023-12-04 PROCEDURE — G8417 CALC BMI ABV UP PARAM F/U: HCPCS | Performed by: INTERNAL MEDICINE

## 2023-12-04 ASSESSMENT — PATIENT HEALTH QUESTIONNAIRE - PHQ9
SUM OF ALL RESPONSES TO PHQ QUESTIONS 1-9: 0
1. LITTLE INTEREST OR PLEASURE IN DOING THINGS: 0
SUM OF ALL RESPONSES TO PHQ QUESTIONS 1-9: 0
2. FEELING DOWN, DEPRESSED OR HOPELESS: 0
SUM OF ALL RESPONSES TO PHQ9 QUESTIONS 1 & 2: 0

## 2023-12-04 ASSESSMENT — ENCOUNTER SYMPTOMS
VOMITING: 0
SORE THROAT: 0
COUGH: 0
NAUSEA: 0
ABDOMINAL PAIN: 0
SHORTNESS OF BREATH: 0
ABDOMINAL DISTENTION: 0

## 2023-12-04 NOTE — PROGRESS NOTES
Zuleyka Mendiola presents today for   Chief Complaint   Patient presents with    Follow-up     1 year f/u with no concerns        Zuleykadung CortesMaury preferred language for health care discussion is english/other. Is someone accompanying this pt? no    Is the patient using any DME equipment during OV? no    Depression Screening:  Depression: Not at risk (12/4/2023)    PHQ-2     PHQ-2 Score: 0        Learning Assessment:  Who is the primary learner? Patient    What is the preferred language for health care of the primary learner? ENGLISH    How does the primary learner prefer to learn new concepts? DEMONSTRATION    Answered By patient    Relationship to Learner SELF           Pt currently taking Anticoagulant therapy? no    Pt currently taking Antiplatelet therapy ? no      Coordination of Care:  1. Have you been to the ER, urgent care clinic since your last visit? Hospitalized since your last visit? no    2. Have you seen or consulted any other health care providers outside of the [address] [place] [address] since your last visit? Include any pap smears or colon screening.  no

## 2023-12-04 NOTE — PROGRESS NOTES
12/04/23     Wero Greer  is a 69 y.o. female     Chief Complaint   Patient presents with    Follow-up     1 year f/u with no concerns        HPI  Patient presents for a follow-up office visit. She has a significant risk factors including a strong family history for premature CAD, essential hypertension, dyslipidemia, and type 2 diabetes. She was initially self-referred for evaluation of shortness of breath which she feels is become progressively worse primarily with exertion. She denies any exertional chest pain or pressure. She does have a history of a traumatic DVT of her lower extremity at the beginning of 2019 which was treated with 3 months of oral anticoagulation. Since that time, she had a follow-up venous duplex scan which was negative for DVT. He does get occasional swelling at the end of the day but this will always improve at night or if she elevates her legs. She denies any orthopnea or PND. No heart palpitations, dizziness nor syncope. She states her weight has been very stable over the past 5 years. She was diagnosed with obstructive sleep apnea many years ago, but cannot tolerate a CPAP facemask and also could not tolerate an oral device. Patient subsequently underwent a follow-up echocardiogram in April 2021 which was a limited but technically difficult study. It did appear that her LV function was mildly depressed, EF 45-50% with global hypokinesis. She recently underwent a right and left heart catheterization November 2021. She had no evidence of pulmonary hypertension. Her right-sided heart pressures were all normal.  Her LVEDP was 10 mmHg, her coronary anatomy was normal.  Her cardiac index was between 2.0 and 2.1. She underwent a follow-up echocardiogram in January 2023 which is unchanged compared to her previous study. EF 45 to 50%, moderate concentric LVH with global hypokinesis. Patient was last seen in our office 1 year ago.   Since last visit, her PCP

## 2023-12-21 NOTE — PROGRESS NOTES
tablet Take 1 tablet by mouth 2 times daily    rosuvastatin (CRESTOR) 20 MG tablet Take 1 tablet by mouth daily    latanoprost (XALATAN) 0.005 % ophthalmic solution Place 1 drop into both eyes nightly     Current Facility-Administered Medications   Medication Dose Route Frequency    sodium hyaluronate (EUFLEXXA, HYALGAN) injection 20 mg  20 mg Intra-artICUlar Once    sodium hyaluronate (EUFLEXXA, HYALGAN) injection 20 mg  20 mg Intra-artICUlar Once        PHYSICAL EXAMINATION:  There were no vitals taken for this visit. ORTHO EXAMINATION:         Examination  Right knee  Left knee         Skin  Intact  Intact         Range of motion  110-0  110-0     Effusion  -  -     Medial joint line tenderness  +  +     Lateral joint line tenderness  -  -     Popliteal tenderness  -  -     Osteophytes palpable  + +     Glory's  -  -     Patella crepitus  -  -     Anterior drawer  -  -     Lateral laxity  -  -     Medial laxity  -  -     Varus deformity  -  -     Valgus deformity  -  -     Pretibial edema  -  -         Calf tenderness  -  -     Ambulating with single point cane        Examination  Lumbar  Thoracic     Skin  Intact  Intact     Tenderness  + L paralumbar  -     Tightness  + L paralumbar  -     Lordosis  Normal  N/A     Kyphosis  N/A  Normal     Scoliosis  -  -     Flexion  Fingertips to mid shin  N/A     Extension  10  N/A     Knee reflexes  Normal  N/A     Ankle reflexes  Normal  N/A     Straight leg raise  -  N/A         Calf tenderness  -  N/A     Ambulates with a single point cane     10/16/23 MRI LUMBAR SPINE [facility]  No definite of transitional L5 vertebra with left-sided sacral assimilation  joint. Severe facet arthrosis L4-5 resulting in grade 1 anterolisthesis L4-5 but  preserved disc height. Mild foraminal stenosis at this level. Mild degenerative disc and facet joint disease elsewhere and disc annular fissures without significant focal disc herniation or neural compression.      RADIOGRAPHS:

## 2023-12-26 ENCOUNTER — OFFICE VISIT (OUTPATIENT)
Age: 78
End: 2023-12-26
Payer: MEDICARE

## 2023-12-26 VITALS — WEIGHT: 224 LBS | BODY MASS INDEX: 36 KG/M2 | HEIGHT: 66 IN | TEMPERATURE: 97.1 F

## 2023-12-26 DIAGNOSIS — M17.12 UNILATERAL PRIMARY OSTEOARTHRITIS, LEFT KNEE: Primary | ICD-10-CM

## 2023-12-26 DIAGNOSIS — M17.11 UNILATERAL PRIMARY OSTEOARTHRITIS, RIGHT KNEE: ICD-10-CM

## 2023-12-26 PROCEDURE — 20610 DRAIN/INJ JOINT/BURSA W/O US: CPT | Performed by: SPECIALIST

## 2023-12-26 RX ORDER — HYALURONATE SODIUM 10 MG/ML
20 SYRINGE (ML) INTRAARTICULAR ONCE
Status: COMPLETED | OUTPATIENT
Start: 2023-12-26 | End: 2023-12-26

## 2023-12-26 RX ADMIN — Medication 20 MG: at 14:24

## 2023-12-26 RX ADMIN — Medication 20 MG: at 14:22

## 2024-01-04 ENCOUNTER — OFFICE VISIT (OUTPATIENT)
Age: 79
End: 2024-01-04
Payer: MEDICARE

## 2024-01-04 VITALS — BODY MASS INDEX: 36.45 KG/M2 | TEMPERATURE: 97.5 F | WEIGHT: 226.8 LBS | HEIGHT: 66 IN

## 2024-01-04 DIAGNOSIS — M17.12 UNILATERAL PRIMARY OSTEOARTHRITIS, LEFT KNEE: Primary | ICD-10-CM

## 2024-01-04 DIAGNOSIS — M17.11 UNILATERAL PRIMARY OSTEOARTHRITIS, RIGHT KNEE: ICD-10-CM

## 2024-01-04 PROCEDURE — 20610 DRAIN/INJ JOINT/BURSA W/O US: CPT | Performed by: SPECIALIST

## 2024-01-04 RX ORDER — HYALURONATE SODIUM 10 MG/ML
20 SYRINGE (ML) INTRAARTICULAR ONCE
Status: COMPLETED | OUTPATIENT
Start: 2024-01-04 | End: 2024-01-04

## 2024-01-04 RX ADMIN — Medication 20 MG: at 14:25

## 2024-01-04 RX ADMIN — Medication 20 MG: at 14:26

## 2024-01-04 NOTE — PROGRESS NOTES
Patient: Marva Gonzalez                MRN: [id number]       SSN: [id number]  YOB: 1945        AGE: 78 y.o.        SEX: female  Body mass index is 36.38 kg/m².    PCP: Kylee Velez MD  01/11/24    Chief Complaint   Patient presents with    Injections     Bilat knees Euflexxa #3     HISTORY:  Marva Gonzalez is a 78 y.o. female who is seen for bilateral knee pain. She presents today for her third injection in the Euflexxa visco supplementation series.    PROCEDURE:  Ms. Maryellen Nix knees injected with 2 cc of Euflexxa.     TIME OUT performed immediately prior to start of procedure:  I, Delmer Castaneda MD, have performed the following reviews on Marva Gonzalez prior to the start of the procedure:            * Patient was identified by name and date of birth   * Agreement on procedure being performed was verified  * Risks and Benefits explained to the patient  * Procedure site verified and marked as necessary  * Patient was positioned for comfort  * Consent was obtained     Time: 2:20 PM     Date of procedure: 1/11/2024    Procedure performed by:  Delmer Castaneda MD    Ms. Maryellen Gonzalez tolerated the procedure well with no complications      ICD-10-CM    1. Unilateral primary osteoarthritis, left knee  M17.12 DRAIN/INJECT LARGE JOINT/BURSA     sodium hyaluronate (EUFLEXXA, HYALGAN) injection 20 mg      2. Unilateral primary osteoarthritis, right knee  M17.11 DRAIN/INJECT LARGE JOINT/BURSA     sodium hyaluronate (EUFLEXXA, HYALGAN) injection 20 mg        PLAN:  Ms. Maryellen Nix knees injected with 2 cc of Euflexxa. Ms. Maryellen Gonzalez will follow up PRN now that she has completed her visco supplementation injection series.     Documentation by walter Elias, as documented by Delmer Castaneda MD.

## 2024-01-11 ENCOUNTER — OFFICE VISIT (OUTPATIENT)
Age: 79
End: 2024-01-11

## 2024-01-11 VITALS — TEMPERATURE: 96 F | HEIGHT: 66 IN | BODY MASS INDEX: 36.22 KG/M2 | WEIGHT: 225.4 LBS

## 2024-01-11 DIAGNOSIS — M17.11 UNILATERAL PRIMARY OSTEOARTHRITIS, RIGHT KNEE: ICD-10-CM

## 2024-01-11 DIAGNOSIS — M17.12 UNILATERAL PRIMARY OSTEOARTHRITIS, LEFT KNEE: Primary | ICD-10-CM

## 2024-01-11 RX ORDER — HYALURONATE SODIUM 10 MG/ML
20 SYRINGE (ML) INTRAARTICULAR ONCE
Status: COMPLETED | OUTPATIENT
Start: 2024-01-11 | End: 2024-01-11

## 2024-01-11 RX ADMIN — Medication 20 MG: at 14:26

## 2024-01-11 RX ADMIN — Medication 20 MG: at 14:25

## 2024-01-21 DIAGNOSIS — M54.16 LUMBAR RADICULOPATHY: ICD-10-CM

## 2024-01-21 DIAGNOSIS — M17.12 PRIMARY OSTEOARTHRITIS OF LEFT KNEE: ICD-10-CM

## 2024-01-21 DIAGNOSIS — M54.16 LUMBAR NEURITIS: ICD-10-CM

## 2024-01-21 DIAGNOSIS — G89.29 CHRONIC PAIN OF LEFT KNEE: ICD-10-CM

## 2024-01-21 DIAGNOSIS — M79.18 MYOFASCIAL PAIN: ICD-10-CM

## 2024-01-21 DIAGNOSIS — M25.562 CHRONIC PAIN OF LEFT KNEE: ICD-10-CM

## 2024-01-21 DIAGNOSIS — M54.50 LUMBAR PAIN: ICD-10-CM

## 2024-01-22 RX ORDER — PREGABALIN 150 MG/1
150 CAPSULE ORAL 2 TIMES DAILY
Qty: 60 CAPSULE | Refills: 2 | OUTPATIENT
Start: 2024-01-22 | End: 2024-04-21

## 2024-01-29 ENCOUNTER — OFFICE VISIT (OUTPATIENT)
Age: 79
End: 2024-01-29
Payer: MEDICARE

## 2024-01-29 VITALS — WEIGHT: 224 LBS | TEMPERATURE: 97.6 F | HEIGHT: 66 IN | BODY MASS INDEX: 36 KG/M2

## 2024-01-29 DIAGNOSIS — M54.50 LUMBAR PAIN: ICD-10-CM

## 2024-01-29 DIAGNOSIS — M54.50 CHRONIC BILATERAL LOW BACK PAIN, UNSPECIFIED WHETHER SCIATICA PRESENT: Primary | ICD-10-CM

## 2024-01-29 DIAGNOSIS — M54.16 LUMBAR NEURITIS: ICD-10-CM

## 2024-01-29 DIAGNOSIS — G89.29 CHRONIC BILATERAL LOW BACK PAIN, UNSPECIFIED WHETHER SCIATICA PRESENT: Primary | ICD-10-CM

## 2024-01-29 DIAGNOSIS — M54.16 LUMBAR RADICULOPATHY: ICD-10-CM

## 2024-01-29 DIAGNOSIS — M79.10 MYALGIA: ICD-10-CM

## 2024-01-29 PROCEDURE — 99213 OFFICE O/P EST LOW 20 MIN: CPT | Performed by: SPECIALIST

## 2024-01-29 PROCEDURE — 20552 NJX 1/MLT TRIGGER POINT 1/2: CPT | Performed by: SPECIALIST

## 2024-01-29 PROCEDURE — 1123F ACP DISCUSS/DSCN MKR DOCD: CPT | Performed by: SPECIALIST

## 2024-01-29 PROCEDURE — 1090F PRES/ABSN URINE INCON ASSESS: CPT | Performed by: SPECIALIST

## 2024-01-29 PROCEDURE — G8417 CALC BMI ABV UP PARAM F/U: HCPCS | Performed by: SPECIALIST

## 2024-01-29 PROCEDURE — G8484 FLU IMMUNIZE NO ADMIN: HCPCS | Performed by: SPECIALIST

## 2024-01-29 PROCEDURE — G8427 DOCREV CUR MEDS BY ELIG CLIN: HCPCS | Performed by: SPECIALIST

## 2024-01-29 PROCEDURE — G8399 PT W/DXA RESULTS DOCUMENT: HCPCS | Performed by: SPECIALIST

## 2024-01-29 PROCEDURE — 1036F TOBACCO NON-USER: CPT | Performed by: SPECIALIST

## 2024-01-29 RX ORDER — BETAMETHASONE SODIUM PHOSPHATE AND BETAMETHASONE ACETATE 3; 3 MG/ML; MG/ML
3 INJECTION, SUSPENSION INTRA-ARTICULAR; INTRALESIONAL; INTRAMUSCULAR; SOFT TISSUE ONCE
Status: COMPLETED | OUTPATIENT
Start: 2024-01-29 | End: 2024-01-29

## 2024-01-29 RX ADMIN — BETAMETHASONE SODIUM PHOSPHATE AND BETAMETHASONE ACETATE 3 MG: 3; 3 INJECTION, SUSPENSION INTRA-ARTICULAR; INTRALESIONAL; INTRAMUSCULAR; SOFT TISSUE at 16:22

## 2024-01-29 NOTE — PROGRESS NOTES
Patient: Marva Gonzalez                MRN: [id number]       SSN: [id number]  YOB: 1945        AGE: 78 y.o.        SEX: female      PCP: Kylee Velez MD  01/29/24    Chief Complaint   Patient presents with    Lower Back Pain     HISTORY:  Marva Gonzalez is a 78 y.o. female who is seen for low back pain. Her pain has increased recently.  No history of recent back injury.  Her low back pain radiates into her left buttock. She previously completed a course of PT but it did not help much.     She successfully completed a  bilateral Orthovisc series on 4/27/23 and a bilateral Euflexxa series on 1/11/24.       She sustained lower extremity injuries on 1/15/19. She states that she missed a step while at a restaurant in [place]--[facility]. Duplex ultrasound was positive for acute occlusive thrombus present in one of two left peroneal veins. She was seen  by Dr. Edge who started her on Eliquis.     Occupation, etc: Ms. Maryellen Gonzalez is retired.  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  She lives with her niece and nephew in [place]. She has a daughter in [place], a son in [place] and another son in  [place] who works for the [employer]. Her son previously worked nuclear security in [place]. Her son in [place] has severe glaucoma and works with a disability company doing yard work for the [employer]. Her boyfriend had a hip  surgery by Dr. Tristan that got infected. She lived in [place] for a short amount of time as her  worked for the [employer]. She would like to get a dog but can't afford one.  Ms. Maryellen Gonzalez weighs 216 lbs and is 5'6\" tall.     Wt Readings from Last 3 Encounters:   01/29/24 101.6 kg (224 lb)   01/11/24 102.2 kg (225 lb 6.4 oz)   01/04/24 102.9 kg (226 lb 12.8 oz)      Body mass index is 36.15 kg/m².    Patient Active Problem List   Diagnosis    Other hyperlipidemia    PILI (obstructive sleep apnea)

## 2024-04-22 ENCOUNTER — PROCEDURE VISIT (OUTPATIENT)
Age: 79
End: 2024-04-22

## 2024-04-22 VITALS — BODY MASS INDEX: 35.2 KG/M2 | HEIGHT: 66 IN | WEIGHT: 219 LBS

## 2024-04-22 DIAGNOSIS — R06.02 SHORTNESS OF BREATH: Primary | ICD-10-CM

## 2024-05-14 ENCOUNTER — OFFICE VISIT (OUTPATIENT)
Age: 79
End: 2024-05-14
Payer: MEDICARE

## 2024-05-14 VITALS
WEIGHT: 221.2 LBS | HEIGHT: 66 IN | RESPIRATION RATE: 16 BRPM | DIASTOLIC BLOOD PRESSURE: 64 MMHG | BODY MASS INDEX: 35.55 KG/M2 | OXYGEN SATURATION: 97 % | TEMPERATURE: 97 F | HEART RATE: 83 BPM | SYSTOLIC BLOOD PRESSURE: 132 MMHG

## 2024-05-14 DIAGNOSIS — R94.2 ABNORMAL PFT: Primary | ICD-10-CM

## 2024-05-14 PROCEDURE — G8417 CALC BMI ABV UP PARAM F/U: HCPCS | Performed by: INTERNAL MEDICINE

## 2024-05-14 PROCEDURE — 1123F ACP DISCUSS/DSCN MKR DOCD: CPT | Performed by: INTERNAL MEDICINE

## 2024-05-14 PROCEDURE — 99203 OFFICE O/P NEW LOW 30 MIN: CPT | Performed by: INTERNAL MEDICINE

## 2024-05-14 PROCEDURE — 1036F TOBACCO NON-USER: CPT | Performed by: INTERNAL MEDICINE

## 2024-05-14 PROCEDURE — 3078F DIAST BP <80 MM HG: CPT | Performed by: INTERNAL MEDICINE

## 2024-05-14 PROCEDURE — 1090F PRES/ABSN URINE INCON ASSESS: CPT | Performed by: INTERNAL MEDICINE

## 2024-05-14 PROCEDURE — G8428 CUR MEDS NOT DOCUMENT: HCPCS | Performed by: INTERNAL MEDICINE

## 2024-05-14 PROCEDURE — 3075F SYST BP GE 130 - 139MM HG: CPT | Performed by: INTERNAL MEDICINE

## 2024-05-14 PROCEDURE — G8399 PT W/DXA RESULTS DOCUMENT: HCPCS | Performed by: INTERNAL MEDICINE

## 2024-05-14 NOTE — PROGRESS NOTES
Marva GRANT Maryellen Gonzalez presents today for   Chief Complaint   Patient presents with    Shortness of Breath     W/ Pft        Is someone accompanying this pt? No    Is the patient using any DME equipment during OV? Cane    -DME Company No    Depression Screenin/4/2023    11:10 AM   PHQ-9 Questionaire   Little interest or pleasure in doing things 0   Feeling down, depressed, or hopeless 0   PHQ-9 Total Score 0       Learning Assessment:    Failed to redirect to the Timeline version of the [employer]ina [employer] SmartLink.    Abuse Screening:         No data to display                Fall Risk    Failed to redirect to the Timeline version of the Georgina Goodman SmartLink.    Coordination of Care:    1. Have you been to the ER, urgent care clinic since your last visit? Hospitalized since your last visit? No    2. Have you seen or consulted any other health care providers outside of the [facility] since your last visit? Include any pap smears or colon screening. No    Medication list has been update per patient.

## 2024-05-14 NOTE — PROGRESS NOTES
[facility]                                                       Pulmonary, Critical Care, and Sleep Medicine      Pulmonary Office Initial Consultation.    Name: Marva Gonzalez     : 1945     Date: 2024        Subjective:   Chief complaint: Abnormal PFT  Patient is a 79 y.o. female with a PMHx of Diverticulosis, DM, HTN, HFrEF, Glaucoma, Melanoma, Sleep apnea, and OA.    HPI (24):  Today in clinic, she denies shortness of breath. She was referred to the clinic due to abnormal CT chest.  No cough, fever, or chills.  No significant change in weight. Started on Manjero and lost a few pounds.  No history of Asthma or lung cancer.  Hemoptysis: none    Current inhalers and/or respiratory treatments:  -none    Tobacco/Substance/Vape/Hookah: never smoker. No MJ, vape or hookah use. +Second-hand smoke exposure.  Occupation: .  Travel: none  TB / TB exposure/testing or TB treatment: no known exposures  VTE/DVT history: told she has a \"clot\" in her ovaries. Following with Dr. Robb. On Plavix. DVT - secondary to fall 5-6 years ago.   service: no  Autoimmune disease: none  COVID-19 (+) testing: no prior history  COVID-19 vaccination: s/p Moderna x2 doses      Sleep:  -Diagnosed several years ago. Tried on CPAP but unable to tolerate. Used a mouth piece and it caused the crowns to be dysfunctional ultimately leading to her needing dental implants. Not willing to do anything further with sleep apnea. States if she dies, she dies.    Past Surgical History:   Procedure Laterality Date    CARDIAC CATHETERIZATION      CATARACT REMOVAL      right eye    CHOLECYSTECTOMY      COLONOSCOPY N/A 3/16/2017    COLONOSCOPY performed by Jacob Liz MD at [facility] ENDOSCOPY    CYST REMOVAL      DILATION AND CURETTAGE OF UTERUS      HERNIA REPAIR  12    HYSTERECTOMY (CERVIX STATUS UNKNOWN)      MALIGNANT SKIN

## 2024-05-14 NOTE — PATIENT INSTRUCTIONS
What is the plan?  -Increase activity and exercise in order to maintain a healthy weight.    -Talk to your Primary Care Provider about Wegovy to see if that will be an option for you for management of your Diabetes and Weight loss.

## 2024-06-03 ENCOUNTER — HOSPITAL ENCOUNTER (OUTPATIENT)
Facility: HOSPITAL | Age: 79
Discharge: HOME | End: 2024-06-06
Payer: MEDICARE

## 2024-06-03 DIAGNOSIS — D72.820 LYMPHOCYTOSIS: ICD-10-CM

## 2024-06-03 DIAGNOSIS — C43.62 MALIGNANT MELANOMA OF LEFT UPPER EXTREMITY INCLUDING SHOULDER (HCC): ICD-10-CM

## 2024-06-03 DIAGNOSIS — I82.90 THROMBOSIS: ICD-10-CM

## 2024-06-03 LAB — CREAT UR-MCNC: 1.3 MG/DL (ref 0.6–1.3)

## 2024-06-03 PROCEDURE — 74177 CT ABD & PELVIS W/CONTRAST: CPT

## 2024-06-03 PROCEDURE — 82565 ASSAY OF CREATININE: CPT

## 2024-06-03 PROCEDURE — 6360000004 HC RX CONTRAST MEDICATION

## 2024-06-03 RX ADMIN — IOPAMIDOL 65 ML: 612 INJECTION, SOLUTION INTRAVENOUS at 17:33

## 2024-07-01 ENCOUNTER — TELEPHONE (OUTPATIENT)
Age: 79
End: 2024-07-01

## 2024-07-01 NOTE — TELEPHONE ENCOUNTER
Patient called again to schedule a follow up visit for when her doctor returns.  Requests that we cancel out this message as she fells much better after taking a shower this morning.

## 2024-07-01 NOTE — TELEPHONE ENCOUNTER
7/1/24 Called patient today. No answer to see how she was feeling. Left a message for her to call.

## 2024-07-01 NOTE — TELEPHONE ENCOUNTER
Patient called in requesting to see Dr. Castaneda today for an injection in her back because she can't stand up straight without a lot of pain.     I advised her that he is currently out of office so she's requesting to see ANN Zhang and is asking if she can be seen today.    Please advise. Patient can be reached at [phone].

## 2024-07-02 ENCOUNTER — TRANSCRIBE ORDERS (OUTPATIENT)
Facility: HOSPITAL | Age: 79
End: 2024-07-02

## 2024-07-02 DIAGNOSIS — C43.62 MALIGNANT MELANOMA OF SKIN OF UPPER LIMB, INCLUDING SHOULDER, LEFT (HCC): ICD-10-CM

## 2024-07-02 DIAGNOSIS — C43.62 MALIGNANT MELANOMA OF SKIN OF UPPER LIMB, INCLUDING SHOULDER, LEFT (HCC): Primary | ICD-10-CM

## 2024-07-02 DIAGNOSIS — D72.820 LYMPHOCYTOSIS (SYMPTOMATIC): Primary | ICD-10-CM

## 2024-07-02 DIAGNOSIS — D72.820 LYMPHOCYTOSIS (SYMPTOMATIC): ICD-10-CM

## 2024-07-05 NOTE — TELEPHONE ENCOUNTER
Patient returned our call, says she's doing better but that another doctor looked at her MRI from last year and said there may be concerns (she couldn't remember which of our docs looked at it).  She would like to either have another MRI order put in or is curious if one of the MRI's she has scheduled for 7/12 for her abdomen may capture the information we're looking for.  Please review and advise patient back at [phone].

## 2024-07-05 NOTE — TELEPHONE ENCOUNTER
Patient notified that ANN Zhang is out of the office until Monday.  Patient is asking if she can have a STAT MRI of lumbar spine.

## 2024-07-12 ENCOUNTER — HOSPITAL ENCOUNTER (OUTPATIENT)
Facility: HOSPITAL | Age: 79
End: 2024-07-12
Attending: INTERNAL MEDICINE
Payer: MEDICARE

## 2024-07-12 DIAGNOSIS — D72.820 LYMPHOCYTOSIS (SYMPTOMATIC): ICD-10-CM

## 2024-07-12 DIAGNOSIS — C43.62 MALIGNANT MELANOMA OF SKIN OF UPPER LIMB, INCLUDING SHOULDER, LEFT (HCC): ICD-10-CM

## 2024-07-12 LAB — CREAT UR-MCNC: 1.7 MG/DL (ref 0.6–1.3)

## 2024-07-12 PROCEDURE — 82565 ASSAY OF CREATININE: CPT

## 2024-07-12 PROCEDURE — 72197 MRI PELVIS W/O & W/DYE: CPT

## 2024-07-12 PROCEDURE — A9577 INJ MULTIHANCE: HCPCS | Performed by: INTERNAL MEDICINE

## 2024-07-12 PROCEDURE — 74183 MRI ABD W/O CNTR FLWD CNTR: CPT

## 2024-07-12 PROCEDURE — 6360000004 HC RX CONTRAST MEDICATION: Performed by: INTERNAL MEDICINE

## 2024-07-12 RX ADMIN — GADOBENATE DIMEGLUMINE 20 ML: 529 INJECTION, SOLUTION INTRAVENOUS at 13:13

## 2024-07-25 NOTE — PROGRESS NOTES
prior to start of procedure:  I, Delmer Castaneda MD, have performed the following reviews on Marva Gonzalez prior to the start of the procedure:            * Patient was identified by name and date of birth   * Agreement on procedure being performed was verified  * Risks and Benefits explained to the patient  * Procedure site verified and marked as necessary  * Patient was positioned for comfort  * Consent was obtained  Time: 3:16 PM  Date of procedure: 7/29/2024    Procedure performed by:  Dr. Castaneda    Ms. Maryellen Gonzalez tolerated the procedure well with no complications.    IMPRESSION:      ICD-10-CM    1. Chronic pain of left knee  M25.562 Ambulatory Referral to Ortho Injection    G89.29 DRAIN/INJECT LARGE JOINT/BURSA     betamethasone acetate-betamethasone sodium phosphate (CELESTONE) injection 3 mg     BUPivacaine (MARCAINE) 0.5 % injection 20 mg      2. Chronic pain of right knee  M25.561 Ambulatory Referral to Ortho Injection    G89.29 DRAIN/INJECT LARGE JOINT/BURSA     betamethasone acetate-betamethasone sodium phosphate (CELESTONE) injection 3 mg     BUPivacaine (MARCAINE) 0.5 % injection 20 mg      3. Unilateral primary osteoarthritis, left knee  M17.12 Ambulatory Referral to Ortho Injection     DRAIN/INJECT LARGE JOINT/BURSA     betamethasone acetate-betamethasone sodium phosphate (CELESTONE) injection 3 mg     BUPivacaine (MARCAINE) 0.5 % injection 20 mg      4. Unilateral primary osteoarthritis, right knee  M17.11 Ambulatory Referral to Ortho Injection     DRAIN/INJECT LARGE JOINT/BURSA     betamethasone acetate-betamethasone sodium phosphate (CELESTONE) injection 3 mg     BUPivacaine (MARCAINE) 0.5 % injection 20 mg      5. Myalgia  M79.10 FL INJECTION SINGLE/MLT TRIGGER POINT 1/2 MUSCLES     betamethasone acetate-betamethasone sodium phosphate (CELESTONE) injection 3 mg      6. Lumbar pain  M54.50 FL INJECTION SINGLE/MLT TRIGGER POINT 1/2 MUSCLES     betamethasone acetate-betamethasone

## 2024-07-29 ENCOUNTER — OFFICE VISIT (OUTPATIENT)
Age: 79
End: 2024-07-29
Payer: MEDICARE

## 2024-07-29 VITALS — WEIGHT: 219 LBS | HEIGHT: 66 IN | BODY MASS INDEX: 35.2 KG/M2 | TEMPERATURE: 97.1 F

## 2024-07-29 DIAGNOSIS — M25.562 CHRONIC PAIN OF LEFT KNEE: Primary | ICD-10-CM

## 2024-07-29 DIAGNOSIS — G89.29 CHRONIC PAIN OF LEFT KNEE: Primary | ICD-10-CM

## 2024-07-29 DIAGNOSIS — G89.29 CHRONIC PAIN OF RIGHT KNEE: ICD-10-CM

## 2024-07-29 DIAGNOSIS — M25.561 CHRONIC PAIN OF RIGHT KNEE: ICD-10-CM

## 2024-07-29 DIAGNOSIS — M17.12 UNILATERAL PRIMARY OSTEOARTHRITIS, LEFT KNEE: ICD-10-CM

## 2024-07-29 DIAGNOSIS — M54.50 LUMBAR PAIN: ICD-10-CM

## 2024-07-29 DIAGNOSIS — M17.11 UNILATERAL PRIMARY OSTEOARTHRITIS, RIGHT KNEE: ICD-10-CM

## 2024-07-29 DIAGNOSIS — M79.10 MYALGIA: ICD-10-CM

## 2024-07-29 PROCEDURE — 20552 NJX 1/MLT TRIGGER POINT 1/2: CPT | Performed by: SPECIALIST

## 2024-07-29 PROCEDURE — G8399 PT W/DXA RESULTS DOCUMENT: HCPCS | Performed by: SPECIALIST

## 2024-07-29 PROCEDURE — 99213 OFFICE O/P EST LOW 20 MIN: CPT | Performed by: SPECIALIST

## 2024-07-29 PROCEDURE — 1036F TOBACCO NON-USER: CPT | Performed by: SPECIALIST

## 2024-07-29 PROCEDURE — 1090F PRES/ABSN URINE INCON ASSESS: CPT | Performed by: SPECIALIST

## 2024-07-29 PROCEDURE — 1123F ACP DISCUSS/DSCN MKR DOCD: CPT | Performed by: SPECIALIST

## 2024-07-29 PROCEDURE — G8427 DOCREV CUR MEDS BY ELIG CLIN: HCPCS | Performed by: SPECIALIST

## 2024-07-29 PROCEDURE — G8417 CALC BMI ABV UP PARAM F/U: HCPCS | Performed by: SPECIALIST

## 2024-07-29 PROCEDURE — 20610 DRAIN/INJ JOINT/BURSA W/O US: CPT | Performed by: SPECIALIST

## 2024-07-29 RX ORDER — BUPIVACAINE HYDROCHLORIDE 5 MG/ML
4 INJECTION, SOLUTION PERINEURAL ONCE
Status: COMPLETED | OUTPATIENT
Start: 2024-07-29 | End: 2024-07-29

## 2024-07-29 RX ORDER — BETAMETHASONE SODIUM PHOSPHATE AND BETAMETHASONE ACETATE 3; 3 MG/ML; MG/ML
3 INJECTION, SUSPENSION INTRA-ARTICULAR; INTRALESIONAL; INTRAMUSCULAR; SOFT TISSUE ONCE
Status: COMPLETED | OUTPATIENT
Start: 2024-07-29 | End: 2024-07-29

## 2024-07-29 RX ADMIN — BUPIVACAINE HYDROCHLORIDE 20 MG: 5 INJECTION, SOLUTION PERINEURAL at 15:21

## 2024-07-29 RX ADMIN — BETAMETHASONE SODIUM PHOSPHATE AND BETAMETHASONE ACETATE 3 MG: 3; 3 INJECTION, SUSPENSION INTRA-ARTICULAR; INTRALESIONAL; INTRAMUSCULAR; SOFT TISSUE at 15:21

## 2024-07-29 RX ADMIN — BETAMETHASONE SODIUM PHOSPHATE AND BETAMETHASONE ACETATE 3 MG: 3; 3 INJECTION, SUSPENSION INTRA-ARTICULAR; INTRALESIONAL; INTRAMUSCULAR; SOFT TISSUE at 15:22

## 2024-07-29 RX ADMIN — BUPIVACAINE HYDROCHLORIDE 20 MG: 5 INJECTION, SOLUTION PERINEURAL at 15:20

## 2024-08-08 ENCOUNTER — HOSPITAL ENCOUNTER (OUTPATIENT)
Facility: HOSPITAL | Age: 79
Discharge: HOME | End: 2024-08-08
Attending: INTERNAL MEDICINE
Payer: MEDICARE

## 2024-08-08 DIAGNOSIS — D72.820 LYMPHOCYTOSIS: ICD-10-CM

## 2024-08-08 PROCEDURE — 78816 PET IMAGE W/CT FULL BODY: CPT

## 2024-08-08 PROCEDURE — 3430000000 HC RX DIAGNOSTIC RADIOPHARMACEUTICAL: Performed by: INTERNAL MEDICINE

## 2024-08-08 PROCEDURE — A9609 HC RX DIAGNOSTIC RADIOPHARMACEUTICAL: HCPCS | Performed by: INTERNAL MEDICINE

## 2024-08-08 RX ORDER — FLUDEOXYGLUCOSE F-18 500 MCI/ML
13 INJECTION INTRAVENOUS
Status: COMPLETED | OUTPATIENT
Start: 2024-08-08 | End: 2024-08-08

## 2024-08-08 RX ADMIN — FLUDEOXYGLUCOSE F-18 13 MILLICURIE: 500 INJECTION INTRAVENOUS at 16:02

## 2024-08-12 ENCOUNTER — TELEPHONE (OUTPATIENT)
Facility: HOSPITAL | Age: 79
End: 2024-08-12

## 2024-08-13 ENCOUNTER — HOSPITAL ENCOUNTER (OUTPATIENT)
Facility: HOSPITAL | Age: 79
Discharge: HOME | End: 2024-08-13
Attending: RADIOLOGY | Admitting: RADIOLOGY
Payer: MEDICARE

## 2024-08-13 VITALS
TEMPERATURE: 97.8 F | HEART RATE: 76 BPM | DIASTOLIC BLOOD PRESSURE: 44 MMHG | OXYGEN SATURATION: 95 % | SYSTOLIC BLOOD PRESSURE: 102 MMHG | WEIGHT: 215.6 LBS | RESPIRATION RATE: 16 BRPM | BODY MASS INDEX: 34.8 KG/M2

## 2024-08-13 DIAGNOSIS — K76.9 LIVER LESION: ICD-10-CM

## 2024-08-13 LAB
ANION GAP SERPL CALC-SCNC: 8 MMOL/L (ref 3–18)
APTT PPP: 24.4 SEC (ref 23–36.4)
BASOPHILS # BLD: 0.1 K/UL (ref 0–0.1)
BASOPHILS NFR BLD: 1 % (ref 0–2)
BUN SERPL-MCNC: 33 MG/DL (ref 7–18)
BUN/CREAT SERPL: 26 (ref 12–20)
CALCIUM SERPL-MCNC: 9.2 MG/DL (ref 8.5–10.1)
CHLORIDE SERPL-SCNC: 109 MMOL/L (ref 100–111)
CO2 SERPL-SCNC: 23 MMOL/L (ref 21–32)
CREAT SERPL-MCNC: 1.29 MG/DL (ref 0.6–1.3)
DIFFERENTIAL METHOD BLD: ABNORMAL
EOSINOPHIL # BLD: 0.1 K/UL (ref 0–0.4)
EOSINOPHIL NFR BLD: 1 % (ref 0–5)
ERYTHROCYTE [DISTWIDTH] IN BLOOD BY AUTOMATED COUNT: 14.1 % (ref 11.6–14.5)
GLUCOSE BLD STRIP.AUTO-MCNC: 135 MG/DL (ref 70–110)
GLUCOSE SERPL-MCNC: 132 MG/DL (ref 74–99)
HCT VFR BLD AUTO: 38.9 % (ref 35–45)
HGB BLD-MCNC: 13.1 G/DL (ref 12–16)
IMM GRANULOCYTES # BLD AUTO: 0.1 K/UL (ref 0–0.04)
IMM GRANULOCYTES NFR BLD AUTO: 1 % (ref 0–0.5)
INR PPP: 1 (ref 0.9–1.1)
LYMPHOCYTES # BLD: 3 K/UL (ref 0.9–3.6)
LYMPHOCYTES NFR BLD: 23 % (ref 21–52)
MCH RBC QN AUTO: 30.7 PG (ref 24–34)
MCHC RBC AUTO-ENTMCNC: 33.7 G/DL (ref 31–37)
MCV RBC AUTO: 91.1 FL (ref 78–100)
MONOCYTES # BLD: 1.3 K/UL (ref 0.05–1.2)
MONOCYTES NFR BLD: 10 % (ref 3–10)
NEUTS SEG # BLD: 8.1 K/UL (ref 1.8–8)
NEUTS SEG NFR BLD: 65 % (ref 40–73)
NRBC # BLD: 0 K/UL (ref 0–0.01)
NRBC BLD-RTO: 0 PER 100 WBC
PLATELET # BLD AUTO: 283 K/UL (ref 135–420)
PMV BLD AUTO: 10.9 FL (ref 9.2–11.8)
POTASSIUM SERPL-SCNC: 4 MMOL/L (ref 3.5–5.5)
PROTHROMBIN TIME: 13 SEC (ref 11.9–14.9)
RBC # BLD AUTO: 4.27 M/UL (ref 4.2–5.3)
SODIUM SERPL-SCNC: 140 MMOL/L (ref 136–145)
WBC # BLD AUTO: 12.6 K/UL (ref 4.6–13.2)

## 2024-08-13 PROCEDURE — 88307 TISSUE EXAM BY PATHOLOGIST: CPT

## 2024-08-13 PROCEDURE — 80048 BASIC METABOLIC PNL TOTAL CA: CPT

## 2024-08-13 PROCEDURE — 94761 N-INVAS EAR/PLS OXIMETRY MLT: CPT

## 2024-08-13 PROCEDURE — 85610 PROTHROMBIN TIME: CPT

## 2024-08-13 PROCEDURE — 82962 GLUCOSE BLOOD TEST: CPT

## 2024-08-13 PROCEDURE — 99153 MOD SED SAME PHYS/QHP EA: CPT

## 2024-08-13 PROCEDURE — 85730 THROMBOPLASTIN TIME PARTIAL: CPT

## 2024-08-13 PROCEDURE — 6360000002 HC RX W HCPCS: Performed by: RADIOLOGY

## 2024-08-13 PROCEDURE — 7100000010 HC PHASE II RECOVERY - FIRST 15 MIN

## 2024-08-13 PROCEDURE — 85025 COMPLETE CBC W/AUTO DIFF WBC: CPT

## 2024-08-13 PROCEDURE — 7100000011 HC PHASE II RECOVERY - ADDTL 15 MIN

## 2024-08-13 PROCEDURE — 88333 PATH CONSLTJ SURG CYTO XM 1: CPT

## 2024-08-13 PROCEDURE — 88334 PATH CONSLTJ SURG CYTO XM EA: CPT

## 2024-08-13 RX ORDER — SODIUM CHLORIDE 9 MG/ML
INJECTION, SOLUTION INTRAVENOUS CONTINUOUS
Status: DISCONTINUED | OUTPATIENT
Start: 2024-08-13 | End: 2024-08-13 | Stop reason: HOSPADM

## 2024-08-13 RX ORDER — MIDAZOLAM HYDROCHLORIDE 2 MG/2ML
INJECTION, SOLUTION INTRAMUSCULAR; INTRAVENOUS PRN
Status: COMPLETED | OUTPATIENT
Start: 2024-08-13 | End: 2024-08-13

## 2024-08-13 RX ORDER — MIDAZOLAM HYDROCHLORIDE 1 MG/ML
INJECTION INTRAMUSCULAR; INTRAVENOUS
Status: DISCONTINUED
Start: 2024-08-13 | End: 2024-08-13 | Stop reason: HOSPADM

## 2024-08-13 RX ORDER — FENTANYL CITRATE 50 UG/ML
INJECTION, SOLUTION INTRAMUSCULAR; INTRAVENOUS PRN
Status: COMPLETED | OUTPATIENT
Start: 2024-08-13 | End: 2024-08-13

## 2024-08-13 RX ORDER — FENTANYL CITRATE 50 UG/ML
INJECTION, SOLUTION INTRAMUSCULAR; INTRAVENOUS
Status: DISCONTINUED
Start: 2024-08-13 | End: 2024-08-13 | Stop reason: HOSPADM

## 2024-08-13 RX ADMIN — MIDAZOLAM HYDROCHLORIDE 1 MG: 1 INJECTION, SOLUTION INTRAMUSCULAR; INTRAVENOUS at 13:50

## 2024-08-13 RX ADMIN — MIDAZOLAM HYDROCHLORIDE 1 MG: 1 INJECTION, SOLUTION INTRAMUSCULAR; INTRAVENOUS at 13:43

## 2024-08-13 RX ADMIN — FENTANYL CITRATE 50 MCG: 50 INJECTION INTRAMUSCULAR; INTRAVENOUS at 13:43

## 2024-08-13 RX ADMIN — FENTANYL CITRATE 50 MCG: 50 INJECTION INTRAMUSCULAR; INTRAVENOUS at 13:50

## 2024-08-13 ASSESSMENT — PAIN SCALES - GENERAL
PAINLEVEL_OUTOF10: 0
PAINLEVEL_OUTOF10: 0

## 2024-08-13 ASSESSMENT — PAIN - FUNCTIONAL ASSESSMENT: PAIN_FUNCTIONAL_ASSESSMENT: 0-10

## 2024-08-13 NOTE — PROCEDURES
RADIOLOGY POST PROCEDURE NOTE     August 13, 2024          Preoperative Diagnosis:   Liver lesions. H/o Melanoma.    Postoperative Diagnosis:  Same.    :  Dr. Dial    Assistant:  None.    Type of Anesthesia: 1% plain lidocaine and IV moderate sedation with Versed and Fentanyl.    Procedure/Description:  Image guided right hepatic lobe segment 6 lesion core needle Bx.    Findings:   No bleeding.    Estimated blood Loss:  Minimal    Specimen Removed:  Yes    Blood transfusions:  None.    Implants:  None.    Complications: None    Condition: Stable    Discharge Plan:  discharge home  if stable and no bleeding. Resume blood thinners if any in 48 hours.    Maury Dial MD

## 2024-08-13 NOTE — H&P
eyes water    Oxycodone-Acetaminophen Other (See Comments)     Other reaction(s): other/intolerance  Intolerance  Also percocet    Patient denies allergy today- 07/13/2023       Physical Exam:      There were no vitals taken for this visit.    Physical Exam:  Mallampati 2 ASA 3  General: A&O x 4, NAD  Heart: RRR  Lungs: Normal work of breathing on room air    Labs Reviewed:  All lab results for the last 24 hours reviewed    Sedation plan:   Moderate sedation with intravenous fentanyl and Versed    Assessment/Plan     The patient is an appropriate candidate to undergo the planned procedure and sedation    ANN Flowers

## 2024-08-13 NOTE — PROGRESS NOTES
TRANSFER - OUT REPORT:    Verbal report given to Rosalee FERNANDEZ on Marva Gonzalez  being transferred to Phase 2 for routine post-op       Report consisted of patient's Situation, Background, Assessment and   Recommendations(SBAR).     Information from the following report(s) Nurse Handoff Report was reviewed with the receiving nurse.           Lines:   Peripheral IV 08/13/24 Right Antecubital (Active)        Opportunity for questions and clarification was provided.      Patient transported with:  Registered Nurse

## 2024-08-13 NOTE — PERIOP NOTE
Patient /Family /Designee has been informed that [facility] is not responsible for patient belongings per policy and the signed [facility] Patient Agreement document.  Personal items should be sent home or checked in with security.  Patient /Family /Designee selected the following action:                            [x]  Send personal items home with a family member or friend                                                 []  Check in personal items with security, excluding clothing                            []  Maintain personal items at the bedside, against recommendation                                 by Farhan Cruz [facility]                                   ** If patient /family /designee chooses to maintain personal items at the bedside,                                      Complete the patient belongings inventory in the EMR.

## 2024-08-26 NOTE — PROGRESS NOTES
Patient: Marva Gonzalez                MRN: [id number]       SSN: [id number]  YOB: 1945        AGE: 79 y.o.        SEX: female      PCP: Kylee Velez MD  08/30/24    Chief Complaint   Patient presents with    Lower back pain     BILATERAL KNEE EUFLEXXA #1     HISTORY:  Marva Gonzalez is a 79 y.o. female who is seen for lower back pain.  Her lower back pain has increased recently.  No history of recent back injury.  Her low back pain radiates into her left buttock. She previously completed a course of PT but it did not help much. She has responded to previous cortisone injections in her back but her pains have returned. She has seen Dr. Nevarez for her back pain in the past.She is not interested in any back surgery.      She successfully completed a  bilateral Orthovisc series on 4/27/23 and a bilateral Euflexxa series on 1/11/24.       She sustained lower extremity injuries on 1/15/19. She states that she missed a step while at a restaurant in [place]. Duplex ultrasound was positive for acute occlusive thrombus present in one of two left peroneal veins. She was seen  by Dr. Edge who started her on Eliquis.      Occupation, etc: Ms. Maryellen Gonzalez is retired.  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  She lives with her niece and nephew in [place]. She has a daughter in [place], a son in [place] and another son in [place] who works for the [employer]. Her son will be moving to [place] soon for a new position. Her son previously worked nuclear security in [employer]. Her son in [place] has severe glaucoma and works with a disability company doing yard work for the [employer]. Her boyfriend had a hip  surgery by Dr. Tristan that got infected. She lived in [place] for a short amount of time as her  worked for the [employer]. She would like to get a dog but can't afford one.  Ms. Maryellen Gonzalez weighs 219 lbs and is  DAY IN THE MORNING ON AN EMPTY STOMACH    Dulaglutide (TRULICITY) 0.75 MG/0.5ML SOPN Inject 0.5 mLs into the skin every 7 days    glimepiride (AMARYL) 4 MG tablet Take 1 tablet by mouth 2 times daily    rosuvastatin (CRESTOR) 20 MG tablet Take 1 tablet by mouth daily    latanoprost (XALATAN) 0.005 % ophthalmic solution Place 1 drop into both eyes nightly     Current Facility-Administered Medications   Medication Dose Route Frequency    sodium hyaluronate (EUFLEXXA, HYALGAN) injection 20 mg  20 mg Intra-artICUlar Once    sodium hyaluronate (EUFLEXXA, HYALGAN) injection 20 mg  20 mg Intra-artICUlar Once    betamethasone acetate-betamethasone sodium phosphate (CELESTONE) injection 3 mg  3 mg IntraMUSCular Once        PHYSICAL EXAMINATION:  Temp 97 °F (36.1 °C) (Temporal)   Ht 1.676 m (5' 6\")   Wt 97.5 kg (215 lb)   BMI 34.70 kg/m²      ORTHO EXAMINATION:  Examination Right knee Left knee   Skin Intact Intact   Range of motion 120-0 120-0   Effusion - -   Medial joint line tenderness + +   Lateral joint line tenderness - -   Popliteal tenderness - -   Osteophytes palpable + +   Glory’s - -   Patella crepitus + +   Anterior drawer - -   Lateral laxity - -   Medial laxity - -   Varus deformity - -   Valgus deformity - -   Pretibial edema - -   Calf tenderness - -           Examination  Lumbar  Thoracic     Skin  Intact  Intact     Tenderness  + L lower paralumbar  -     Tightness  + L paralumbar  -     Lordosis  Normal  N/A     Kyphosis  N/A  Normal     Scoliosis  -  -     Flexion  Fingertips to mid shin  N/A     Extension  10  N/A     Knee reflexes  Normal  N/A     Ankle reflexes  Normal  N/A     Straight leg raise  -  N/A         Calf tenderness  -  N/A     Ambulates with a single point cane     10/16/23 MRI LUMBAR SPINE [facility]  No definite of transitional L5 vertebra with left-sided sacral assimilation  joint.     Severe facet arthrosis L4-5 resulting in grade 1 anterolisthesis L4-5 but  preserved disc height. Mild

## 2024-08-30 ENCOUNTER — OFFICE VISIT (OUTPATIENT)
Age: 79
End: 2024-08-30

## 2024-08-30 VITALS — WEIGHT: 215 LBS | HEIGHT: 66 IN | BODY MASS INDEX: 34.55 KG/M2 | TEMPERATURE: 97 F

## 2024-08-30 DIAGNOSIS — M54.50 LUMBAR PAIN: ICD-10-CM

## 2024-08-30 DIAGNOSIS — M17.11 UNILATERAL PRIMARY OSTEOARTHRITIS, RIGHT KNEE: ICD-10-CM

## 2024-08-30 DIAGNOSIS — M79.10 MYALGIA: ICD-10-CM

## 2024-08-30 DIAGNOSIS — M17.12 UNILATERAL PRIMARY OSTEOARTHRITIS, LEFT KNEE: Primary | ICD-10-CM

## 2024-08-30 RX ORDER — BETAMETHASONE SODIUM PHOSPHATE AND BETAMETHASONE ACETATE 3; 3 MG/ML; MG/ML
3 INJECTION, SUSPENSION INTRA-ARTICULAR; INTRALESIONAL; INTRAMUSCULAR; SOFT TISSUE ONCE
Status: COMPLETED | OUTPATIENT
Start: 2024-08-30 | End: 2024-08-30

## 2024-08-30 RX ORDER — HYALURONATE SODIUM 10 MG/ML
20 SYRINGE (ML) INTRAARTICULAR ONCE
Status: COMPLETED | OUTPATIENT
Start: 2024-08-30 | End: 2024-08-30

## 2024-08-30 RX ADMIN — Medication 20 MG: at 15:13

## 2024-08-30 RX ADMIN — BETAMETHASONE SODIUM PHOSPHATE AND BETAMETHASONE ACETATE 3 MG: 3; 3 INJECTION, SUSPENSION INTRA-ARTICULAR; INTRALESIONAL; INTRAMUSCULAR; SOFT TISSUE at 15:13

## 2024-08-30 RX ADMIN — Medication 20 MG: at 15:14

## 2024-09-04 NOTE — PROGRESS NOTES
Patient: Marva Gonzalez                MRN: [id number]       SSN: [id number]  YOB: 1945        AGE: 79 y.o.        SEX: female  Body mass index is 34.7 kg/m².    PCP: Kylee Velez MD  09/06/24    Chief Complaint   Patient presents with    Knee Pain     Bilateral knee euflexxa #2     HISTORY:  Marva Gonzalez is a 79 y.o. female who is seen for bilateral knee pain. She presents today for her second injection in the Euflexxa visco supplementation series.      ICD-10-CM    1. Unilateral primary osteoarthritis, left knee  M17.12 DRAIN/INJECT LARGE JOINT/BURSA     sodium hyaluronate (EUFLEXXA, HYALGAN) injection 20 mg      2. Unilateral primary osteoarthritis, right knee  M17.11 DRAIN/INJECT LARGE JOINT/BURSA     sodium hyaluronate (EUFLEXXA, HYALGAN) injection 20 mg         PROCEDURE:  Ms. Maryellen Huertas knees injected with 2 cc of Euflexxa.     Chart reviewed for the following:   Delmer WAN MD, have reviewed the History, Physical and updated the Allergic reactions for Marva Gonzalez     TIME OUT performed immediately prior to start of procedure:  Delmer WAN MD, have performed the following reviews on Marva Gonzalez prior to the start of the procedure:            * Patient was identified by name and date of birth   * Agreement on procedure being performed was verified  * Risks and Benefits explained to the patient  * Procedure site verified and marked as necessary  * Patient was positioned for comfort  * Consent was obtained     Time: 2:29 PM     Date of procedure: 9/6/2024    Procedure performed by:  Delmer Castaneda MD    Ms. Maryellen Gonzalez tolerated the procedure well with no complications.    PLAN: Ms. Maryellen Huertas knees injected with 2 cc of Euflexxa. Ms. Maryellen Gonzalez will follow up in one week to complete her visco supplementation injection series.    Documentation by walter Elias, as documented by Delmer Castaneda MD.

## 2024-09-06 ENCOUNTER — OFFICE VISIT (OUTPATIENT)
Age: 79
End: 2024-09-06

## 2024-09-06 VITALS — BODY MASS INDEX: 34.55 KG/M2 | HEIGHT: 66 IN | WEIGHT: 215 LBS | TEMPERATURE: 97 F

## 2024-09-06 DIAGNOSIS — M17.12 UNILATERAL PRIMARY OSTEOARTHRITIS, LEFT KNEE: Primary | ICD-10-CM

## 2024-09-06 DIAGNOSIS — M17.11 UNILATERAL PRIMARY OSTEOARTHRITIS, RIGHT KNEE: ICD-10-CM

## 2024-09-06 RX ORDER — HYALURONATE SODIUM 10 MG/ML
20 SYRINGE (ML) INTRAARTICULAR ONCE
Status: COMPLETED | OUTPATIENT
Start: 2024-09-06 | End: 2024-09-06

## 2024-09-06 RX ADMIN — Medication 20 MG: at 14:31

## 2024-09-16 ENCOUNTER — OFFICE VISIT (OUTPATIENT)
Age: 79
End: 2024-09-16
Payer: MEDICARE

## 2024-09-16 VITALS — BODY MASS INDEX: 34.55 KG/M2 | HEIGHT: 66 IN | TEMPERATURE: 98.2 F | WEIGHT: 215 LBS

## 2024-09-16 DIAGNOSIS — M17.11 UNILATERAL PRIMARY OSTEOARTHRITIS, RIGHT KNEE: ICD-10-CM

## 2024-09-16 DIAGNOSIS — M17.12 UNILATERAL PRIMARY OSTEOARTHRITIS, LEFT KNEE: Primary | ICD-10-CM

## 2024-09-16 PROCEDURE — 20610 DRAIN/INJ JOINT/BURSA W/O US: CPT | Performed by: SPECIALIST

## 2024-09-16 RX ORDER — HYALURONATE SODIUM 10 MG/ML
20 SYRINGE (ML) INTRAARTICULAR ONCE
Status: COMPLETED | OUTPATIENT
Start: 2024-09-16 | End: 2024-09-16

## 2024-09-16 RX ADMIN — Medication 20 MG: at 11:23

## 2024-09-16 RX ADMIN — Medication 20 MG: at 11:24

## 2024-12-09 ENCOUNTER — OFFICE VISIT (OUTPATIENT)
Age: 79
End: 2024-12-09
Payer: MEDICARE

## 2024-12-09 VITALS — BODY MASS INDEX: 34.7 KG/M2 | HEIGHT: 66 IN

## 2024-12-09 DIAGNOSIS — Z79.4 TYPE 2 DIABETES MELLITUS WITHOUT COMPLICATION, WITH LONG-TERM CURRENT USE OF INSULIN (HCC): ICD-10-CM

## 2024-12-09 DIAGNOSIS — I42.9 CARDIOMYOPATHY, UNSPECIFIED TYPE (HCC): Primary | ICD-10-CM

## 2024-12-09 DIAGNOSIS — I10 ESSENTIAL (PRIMARY) HYPERTENSION: ICD-10-CM

## 2024-12-09 DIAGNOSIS — E78.49 OTHER HYPERLIPIDEMIA: ICD-10-CM

## 2024-12-09 DIAGNOSIS — G47.33 OBSTRUCTIVE SLEEP APNEA (ADULT) (PEDIATRIC): ICD-10-CM

## 2024-12-09 DIAGNOSIS — E11.9 TYPE 2 DIABETES MELLITUS WITHOUT COMPLICATION, WITH LONG-TERM CURRENT USE OF INSULIN (HCC): ICD-10-CM

## 2024-12-09 DIAGNOSIS — N18.30 STAGE 3 CHRONIC KIDNEY DISEASE, UNSPECIFIED WHETHER STAGE 3A OR 3B CKD (HCC): ICD-10-CM

## 2024-12-09 PROBLEM — E11.65 HYPERGLYCEMIA DUE TO TYPE 2 DIABETES MELLITUS (HCC): Status: ACTIVE | Noted: 2024-12-09

## 2024-12-09 PROBLEM — E11.8 TYPE 2 DIABETES MELLITUS WITH COMPLICATION, WITHOUT LONG-TERM CURRENT USE OF INSULIN (HCC): Status: ACTIVE | Noted: 2018-11-18

## 2024-12-09 PROBLEM — R92.8 ABNORMAL FINDINGS ON DIAGNOSTIC IMAGING OF BREAST: Status: ACTIVE | Noted: 2024-12-09

## 2024-12-09 PROBLEM — E03.9 HYPOTHYROIDISM: Status: ACTIVE | Noted: 2024-12-09

## 2024-12-09 PROBLEM — M81.0 AGE-RELATED OSTEOPOROSIS WITHOUT CURRENT PATHOLOGICAL FRACTURE: Status: ACTIVE | Noted: 2024-12-09

## 2024-12-09 PROBLEM — E55.9 VITAMIN D DEFICIENCY: Status: ACTIVE | Noted: 2024-12-09

## 2024-12-09 PROBLEM — R07.9 CHEST PAIN: Status: ACTIVE | Noted: 2018-11-18

## 2024-12-09 PROBLEM — D51.0 PERNICIOUS ANEMIA: Status: ACTIVE | Noted: 2024-12-09

## 2024-12-09 PROBLEM — E78.5 HYPERLIPIDEMIA: Status: ACTIVE | Noted: 2024-12-09

## 2024-12-09 PROBLEM — N18.2 STAGE 2 CHRONIC KIDNEY DISEASE: Status: ACTIVE | Noted: 2024-12-09

## 2024-12-09 PROBLEM — E78.2 MIXED HYPERLIPIDEMIA: Status: ACTIVE | Noted: 2018-11-18

## 2024-12-09 PROBLEM — E88.09 DISORDER OF PLASMA PROTEIN METABOLISM: Status: ACTIVE | Noted: 2024-12-09

## 2024-12-09 PROCEDURE — G8427 DOCREV CUR MEDS BY ELIG CLIN: HCPCS | Performed by: INTERNAL MEDICINE

## 2024-12-09 PROCEDURE — 99214 OFFICE O/P EST MOD 30 MIN: CPT | Performed by: INTERNAL MEDICINE

## 2024-12-09 PROCEDURE — 1126F AMNT PAIN NOTED NONE PRSNT: CPT | Performed by: INTERNAL MEDICINE

## 2024-12-09 PROCEDURE — 1159F MED LIST DOCD IN RCRD: CPT | Performed by: INTERNAL MEDICINE

## 2024-12-09 PROCEDURE — 1036F TOBACCO NON-USER: CPT | Performed by: INTERNAL MEDICINE

## 2024-12-09 PROCEDURE — G8417 CALC BMI ABV UP PARAM F/U: HCPCS | Performed by: INTERNAL MEDICINE

## 2024-12-09 PROCEDURE — G8399 PT W/DXA RESULTS DOCUMENT: HCPCS | Performed by: INTERNAL MEDICINE

## 2024-12-09 PROCEDURE — 1123F ACP DISCUSS/DSCN MKR DOCD: CPT | Performed by: INTERNAL MEDICINE

## 2024-12-09 PROCEDURE — G8484 FLU IMMUNIZE NO ADMIN: HCPCS | Performed by: INTERNAL MEDICINE

## 2024-12-09 PROCEDURE — 93000 ELECTROCARDIOGRAM COMPLETE: CPT | Performed by: INTERNAL MEDICINE

## 2024-12-09 PROCEDURE — 1090F PRES/ABSN URINE INCON ASSESS: CPT | Performed by: INTERNAL MEDICINE

## 2024-12-09 PROCEDURE — 1160F RVW MEDS BY RX/DR IN RCRD: CPT | Performed by: INTERNAL MEDICINE

## 2024-12-09 RX ORDER — APIXABAN 2.5 MG/1
2.5 TABLET, FILM COATED ORAL 2 TIMES DAILY
COMMUNITY
Start: 2024-11-27

## 2024-12-09 ASSESSMENT — ANXIETY QUESTIONNAIRES
3. WORRYING TOO MUCH ABOUT DIFFERENT THINGS: NOT AT ALL
2. NOT BEING ABLE TO STOP OR CONTROL WORRYING: NOT AT ALL
4. TROUBLE RELAXING: NOT AT ALL
GAD7 TOTAL SCORE: 0
6. BECOMING EASILY ANNOYED OR IRRITABLE: NOT AT ALL
7. FEELING AFRAID AS IF SOMETHING AWFUL MIGHT HAPPEN: NOT AT ALL
5. BEING SO RESTLESS THAT IT IS HARD TO SIT STILL: NOT AT ALL
1. FEELING NERVOUS, ANXIOUS, OR ON EDGE: NOT AT ALL

## 2024-12-09 ASSESSMENT — PATIENT HEALTH QUESTIONNAIRE - PHQ9
2. FEELING DOWN, DEPRESSED OR HOPELESS: NOT AT ALL
SUM OF ALL RESPONSES TO PHQ QUESTIONS 1-9: 0
SUM OF ALL RESPONSES TO PHQ9 QUESTIONS 1 & 2: 0
1. LITTLE INTEREST OR PLEASURE IN DOING THINGS: NOT AT ALL
SUM OF ALL RESPONSES TO PHQ QUESTIONS 1-9: 0

## 2024-12-09 ASSESSMENT — ENCOUNTER SYMPTOMS
SHORTNESS OF BREATH: 0
NAUSEA: 0
ABDOMINAL DISTENTION: 0
ABDOMINAL PAIN: 0
SORE THROAT: 0
COUGH: 0
VOMITING: 0

## 2024-12-09 NOTE — PROGRESS NOTES
Marva Gonzalez presents today for   Chief Complaint   Patient presents with    Follow-up     1 year       Marva Gonzalez preferred language for health care discussion is english/other.    Is someone accompanying this pt? no    Is the patient using any DME equipment during OV? no    Depression Screening:  Depression: Not at risk (12/9/2024)    PHQ-2     PHQ-2 Score: 0        Learning Assessment:  Who is the primary learner? Patient    What is the preferred language for health care of the primary learner? ENGLISH    How does the primary learner prefer to learn new concepts? DEMONSTRATION    Answered By patient    Relationship to Learner SELF           Pt currently taking Anticoagulant therapy? no    Pt currently taking Antiplatelet therapy ? no      Coordination of Care:  1. Have you been to the ER, urgent care clinic since your last visit? Hospitalized since your last visit? no    2. Have you seen or consulted any other health care providers outside of the [facility] since your last visit? Include any pap smears or colon screening. no    
major change in her activity tolerance.  She has adjusted her diet and has lost a little weight.  She denies any chest pain, shortness of breath or leg swelling, no orthopnea, no PND.    Past Medical History:   Diagnosis Date    Arthropathy, unspecified, site unspecified     Cancer (HCC)     melanoma    Diabetes mellitus (HCC)     Diabetes mellitus (HCC)     Essential hypertension     Glaucoma     Headache(784.0)     History of echocardiogram 08/12/2013    EF 55%.  No WMA.  Mild LVH.  Gr 2 DDfx.  RVSP 30 mmHg.      Ill-defined condition     macular deg. bilat    Macular degeneration of right eye     Osteoarthritis of both knees     Osteoarthritis of lumbar spine     Osteoarthritis, hip, bilateral     Sleep apnea     no cpap    SOB (shortness of breath) 8/19/2020     Current Outpatient Medications   Medication Sig Dispense Refill    ELIQUIS 2.5 MG TABS tablet Take 1 tablet by mouth 2 times daily      pregabalin (LYRICA) 150 MG capsule Take 1 capsule by mouth 2 times daily for 90 days. Max Daily Amount: 300 mg (Patient taking differently: Take 1 capsule by mouth at bedtime.) 60 capsule 2    losartan (COZAAR) 25 MG tablet Take 1 tablet by mouth daily      nebivolol (BYSTOLIC) 5 MG tablet Take 1 tablet by mouth daily      chlorthalidone (HYGROTON) 25 MG tablet TAKE 1 TABLET BY MOUTH EVERY DAY IN THE MORNING WITH FOOD for 90      levothyroxine (SYNTHROID) 50 MCG tablet TAKE 1 TABLET BY MOUTH EVERY DAY IN THE MORNING ON AN EMPTY STOMACH      Dulaglutide (TRULICITY) 0.75 MG/0.5ML SOPN Inject 0.5 mLs into the skin every 7 days      glimepiride (AMARYL) 4 MG tablet Take 1 tablet by mouth 2 times daily      rosuvastatin (CRESTOR) 20 MG tablet Take 1 tablet by mouth daily      latanoprost (XALATAN) 0.005 % ophthalmic solution Place 1 drop into both eyes nightly       No current facility-administered medications for this visit.     Allergies   Allergen Reactions    Acetaminophen Other (See Comments)     Other reaction(s):

## 2024-12-23 ENCOUNTER — OFFICE VISIT (OUTPATIENT)
Age: 79
End: 2024-12-23
Payer: MEDICARE

## 2024-12-23 VITALS — WEIGHT: 215 LBS | HEIGHT: 66 IN | BODY MASS INDEX: 34.55 KG/M2

## 2024-12-23 DIAGNOSIS — M79.10 MYALGIA: ICD-10-CM

## 2024-12-23 DIAGNOSIS — M54.50 CHRONIC BILATERAL LOW BACK PAIN, UNSPECIFIED WHETHER SCIATICA PRESENT: Primary | ICD-10-CM

## 2024-12-23 DIAGNOSIS — M54.16 LUMBAR RADICULOPATHY: ICD-10-CM

## 2024-12-23 DIAGNOSIS — M54.50 LUMBAR PAIN: ICD-10-CM

## 2024-12-23 DIAGNOSIS — G89.29 CHRONIC BILATERAL LOW BACK PAIN, UNSPECIFIED WHETHER SCIATICA PRESENT: Primary | ICD-10-CM

## 2024-12-23 PROCEDURE — 1036F TOBACCO NON-USER: CPT | Performed by: SPECIALIST

## 2024-12-23 PROCEDURE — G8484 FLU IMMUNIZE NO ADMIN: HCPCS | Performed by: SPECIALIST

## 2024-12-23 PROCEDURE — 1090F PRES/ABSN URINE INCON ASSESS: CPT | Performed by: SPECIALIST

## 2024-12-23 PROCEDURE — 99213 OFFICE O/P EST LOW 20 MIN: CPT | Performed by: SPECIALIST

## 2024-12-23 PROCEDURE — 1125F AMNT PAIN NOTED PAIN PRSNT: CPT | Performed by: SPECIALIST

## 2024-12-23 PROCEDURE — 1123F ACP DISCUSS/DSCN MKR DOCD: CPT | Performed by: SPECIALIST

## 2024-12-23 PROCEDURE — G8399 PT W/DXA RESULTS DOCUMENT: HCPCS | Performed by: SPECIALIST

## 2024-12-23 PROCEDURE — G8417 CALC BMI ABV UP PARAM F/U: HCPCS | Performed by: SPECIALIST

## 2024-12-23 PROCEDURE — 1159F MED LIST DOCD IN RCRD: CPT | Performed by: SPECIALIST

## 2024-12-23 PROCEDURE — G8427 DOCREV CUR MEDS BY ELIG CLIN: HCPCS | Performed by: SPECIALIST

## 2024-12-23 PROCEDURE — 1160F RVW MEDS BY RX/DR IN RCRD: CPT | Performed by: SPECIALIST

## 2024-12-23 PROCEDURE — 20552 NJX 1/MLT TRIGGER POINT 1/2: CPT | Performed by: SPECIALIST

## 2024-12-23 RX ORDER — BETAMETHASONE SODIUM PHOSPHATE AND BETAMETHASONE ACETATE 3; 3 MG/ML; MG/ML
3 INJECTION, SUSPENSION INTRA-ARTICULAR; INTRALESIONAL; INTRAMUSCULAR; SOFT TISSUE ONCE
Status: COMPLETED | OUTPATIENT
Start: 2024-12-23 | End: 2024-12-23

## 2024-12-23 RX ADMIN — BETAMETHASONE SODIUM PHOSPHATE AND BETAMETHASONE ACETATE 3 MG: 3; 3 INJECTION, SUSPENSION INTRA-ARTICULAR; INTRALESIONAL; INTRAMUSCULAR; SOFT TISSUE at 16:35

## 2024-12-23 NOTE — PROGRESS NOTES
Patient: Marva Gonzalez                MRN: [id number]       SSN: [id number]  YOB: 1945        AGE: 79 y.o.        SEX: female      PCP: Kylee Velez MD  12/23/24    Chief Complaint   Patient presents with    Back Pain     HISTORY:  Marva Gonzalez is a 79 y.o. female who is seen for increased lower back pain. No history of recent back injury.  Her lower back pain is mainly on  the right. She states her pain was so severe a few days ago she was unable to walk. Her pain has improved somewhat today and she reports 6/10 pain. Her low back pain radiates into her left buttock. She previously completed a course of PT but it did not help much. She has responded to previous lumbar cortisone injections but her back pain has returned. She has seen Dr. Nevarez for her back pain in the past. She is not interested in back surgery.      She was previously seen for bilateral knee pain. Patient successfully completed a bilateral Euflexxa series on 9/16/24.    She sustained lower extremity injuries on 1/15/19. She states that she missed a step while at a restaurant in [place]. Duplex ultrasound was positive for acute occlusive thrombus present in one of two left peroneal veins. She was seen  by Dr. Edge who started her on Eliquis.      Occupation, etc: Ms. Maryellen Gonzalez is retired.  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 She lives with her niece and nephew in [place]. She has a daughter in [place], a son in [place] and another son in [place] who works for the [employer]. Her son previously worked nuclear security in [place] and will be coming to visit her for [employer]. Her son in [place] has severe glaucoma and works with a disability company doing yard work for the [employer]. Her boyfriend had a hip surgery by Dr. Tristan that got infected. She lived in [place] for a short amount of time as her  worked for the

## 2025-02-24 ENCOUNTER — TELEPHONE (OUTPATIENT)
Age: 80
End: 2025-02-24

## 2025-02-24 DIAGNOSIS — M25.561 CHRONIC PAIN OF RIGHT KNEE: ICD-10-CM

## 2025-02-24 DIAGNOSIS — G89.29 CHRONIC PAIN OF LEFT KNEE: ICD-10-CM

## 2025-02-24 DIAGNOSIS — G89.29 CHRONIC PAIN OF RIGHT KNEE: ICD-10-CM

## 2025-02-24 DIAGNOSIS — M17.12 UNILATERAL PRIMARY OSTEOARTHRITIS, LEFT KNEE: Primary | ICD-10-CM

## 2025-02-24 DIAGNOSIS — M17.11 UNILATERAL PRIMARY OSTEOARTHRITIS, RIGHT KNEE: ICD-10-CM

## 2025-02-24 DIAGNOSIS — M25.562 CHRONIC PAIN OF LEFT KNEE: ICD-10-CM

## 2025-02-24 NOTE — TELEPHONE ENCOUNTER
Patient called and said she would once again like to get the Euflexxa injections for both of her knees from .  That she would like to get them done at the [facility] location.    Patient received the last Eulfexxa injection on 09/16/2024.    Patient tel. [phone].

## 2025-02-24 NOTE — TELEPHONE ENCOUNTER
OK per Dr Castaneda--new order written for bilateral knee visco supplementation==pt informed she will be contacted once approved

## 2025-04-03 ENCOUNTER — TRANSCRIBE ORDERS (OUTPATIENT)
Facility: HOSPITAL | Age: 80
End: 2025-04-03

## 2025-04-03 DIAGNOSIS — Z12.31 ENCOUNTER FOR SCREENING MAMMOGRAM FOR MALIGNANT NEOPLASM OF BREAST: Primary | ICD-10-CM

## 2025-04-30 NOTE — PROGRESS NOTES
mouth 2 times daily    pregabalin (LYRICA) 150 MG capsule Take 1 capsule by mouth 2 times daily for 90 days. Max Daily Amount: 300 mg (Patient taking differently: Take 1 capsule by mouth at bedtime.)    losartan (COZAAR) 25 MG tablet Take 1 tablet by mouth daily    nebivolol (BYSTOLIC) 5 MG tablet Take 1 tablet by mouth daily    chlorthalidone (HYGROTON) 25 MG tablet TAKE 1 TABLET BY MOUTH EVERY DAY IN THE MORNING WITH FOOD for 90    levothyroxine (SYNTHROID) 50 MCG tablet TAKE 1 TABLET BY MOUTH EVERY DAY IN THE MORNING ON AN EMPTY STOMACH    Dulaglutide (TRULICITY) 0.75 MG/0.5ML SOPN Inject 0.5 mLs into the skin every 7 days    glimepiride (AMARYL) 4 MG tablet Take 1 tablet by mouth 2 times daily    rosuvastatin (CRESTOR) 20 MG tablet Take 1 tablet by mouth daily    latanoprost (XALATAN) 0.005 % ophthalmic solution Place 1 drop into both eyes nightly     Current Facility-Administered Medications   Medication Dose Route Frequency    sodium hyaluronate (EUFLEXXA, HYALGAN) injection 20 mg  20 mg Intra-artICUlar Once    sodium hyaluronate (EUFLEXXA, HYALGAN) injection 20 mg  20 mg Intra-artICUlar Once    betamethasone acetate-betamethasone sodium phosphate (CELESTONE) injection 3 mg  3 mg IntraMUSCular Once        PHYSICAL EXAMINATION:  There were no vitals taken for this visit.     ORTHO EXAMINATION:  Examination Right knee Left knee   Skin Intact Intact   Range of motion 120-0 120-0   Effusion - -   Medial joint line tenderness + +   Lateral joint line tenderness - -   Popliteal tenderness - -   Osteophytes palpable + +   Glory’s - -   Patella crepitus + +   Anterior drawer - -   Lateral laxity - -   Medial laxity - -   Varus deformity - -   Valgus deformity - -   Pretibial edema - -   Calf tenderness - -           Examination  Lumbar  Thoracic     Skin  Intact  Intact     Tenderness  + L lower paralumbar  -     Tightness  + L paralumbar  -     Lordosis  Normal  N/A     Kyphosis  N/A  Normal     Scoliosis  -  -

## 2025-05-02 ENCOUNTER — OFFICE VISIT (OUTPATIENT)
Age: 80
End: 2025-05-02

## 2025-05-02 DIAGNOSIS — M54.50 LUMBAR PAIN: ICD-10-CM

## 2025-05-02 DIAGNOSIS — M17.12 UNILATERAL PRIMARY OSTEOARTHRITIS, LEFT KNEE: Primary | ICD-10-CM

## 2025-05-02 DIAGNOSIS — M79.10 MYALGIA: ICD-10-CM

## 2025-05-02 DIAGNOSIS — M54.16 LUMBAR RADICULOPATHY: ICD-10-CM

## 2025-05-02 DIAGNOSIS — M17.11 UNILATERAL PRIMARY OSTEOARTHRITIS, RIGHT KNEE: ICD-10-CM

## 2025-05-02 RX ORDER — BETAMETHASONE SODIUM PHOSPHATE AND BETAMETHASONE ACETATE 3; 3 MG/ML; MG/ML
3 INJECTION, SUSPENSION INTRA-ARTICULAR; INTRALESIONAL; INTRAMUSCULAR; SOFT TISSUE ONCE
Status: COMPLETED | OUTPATIENT
Start: 2025-05-02 | End: 2025-05-02

## 2025-05-02 RX ADMIN — BETAMETHASONE SODIUM PHOSPHATE AND BETAMETHASONE ACETATE 3 MG: 3; 3 INJECTION, SUSPENSION INTRA-ARTICULAR; INTRALESIONAL; INTRAMUSCULAR; SOFT TISSUE at 16:04

## 2025-05-08 NOTE — PROGRESS NOTES
Patient: Marva Gonzalez                MRN: [id number]       SSN: [id number]  YOB: 1945        AGE: 80 y.o.        SEX: female  There is no height or weight on file to calculate BMI.    PCP: Kylee Velez MD  05/09/25    Chief Complaint   Patient presents with    Knee Pain     bilateral     HISTORY:  Marva Gonzalez is a 80 y.o. female who is seen for bilateral knee pain. She presents today for her second injection in the Euflexxa visco supplementation series.      ICD-10-CM    1. Unilateral primary osteoarthritis, left knee  M17.12 DRAIN/INJECT LARGE JOINT/BURSA     sodium hyaluronate (EUFLEXXA, HYALGAN) injection 20 mg      2. Unilateral primary osteoarthritis, right knee  M17.11 DRAIN/INJECT LARGE JOINT/BURSA     sodium hyaluronate (EUFLEXXA, HYALGAN) injection 20 mg         PROCEDURE:  Ms. Maryellen Huertas knees injected with 2 cc of Euflexxa.     Chart reviewed for the following:   Delmer WAN MD, have reviewed the History, Physical and updated the Allergic reactions for Marva Gonzalez     TIME OUT performed immediately prior to start of procedure:  Delmer WAN MD, have performed the following reviews on Marva Gonzalez prior to the start of the procedure:            * Patient was identified by name and date of birth   * Agreement on procedure being performed was verified  * Risks and Benefits explained to the patient  * Procedure site verified and marked as necessary  * Patient was positioned for comfort  * Consent was obtained     Time: 4:07 PM     Date of procedure: 5/9/2025    Procedure performed by:  Delmer Castaneda MD    Ms. Maryellen Gonzalez tolerated the procedure well with no complications.    PLAN: Ms. Maryellen Huertas knees injected with 2 cc of Euflexxa. Ms. Maryellen Gonzalez will follow up in one week to complete her visco supplementation injection series.    Documentation by walter Elias, as documented by Delmer Castaneda MD.  
Closed fracture of one rib of left side

## 2025-05-09 ENCOUNTER — OFFICE VISIT (OUTPATIENT)
Age: 80
End: 2025-05-09

## 2025-05-09 DIAGNOSIS — M17.11 UNILATERAL PRIMARY OSTEOARTHRITIS, RIGHT KNEE: ICD-10-CM

## 2025-05-09 DIAGNOSIS — M17.12 UNILATERAL PRIMARY OSTEOARTHRITIS, LEFT KNEE: Primary | ICD-10-CM

## 2025-05-16 NOTE — PROGRESS NOTES
Patient: Marva Gonzalez                MRN: [id number]       SSN: [id number]  YOB: 1945        AGE: 80 y.o.        SEX: female  There is no height or weight on file to calculate BMI.    PCP: Kylee Velez MD  05/19/25    Chief Complaint   Patient presents with    Knee Pain     bilateral     HISTORY:  Marva Gonzalez is a 80 y.o. female who is seen for bilateral knee pain. She presents today for her third injection in the Euflexxa visco supplementation series.    PROCEDURE:  Ms. Maryellen Nix knees injected with 2 cc of Euflexxa.     TIME OUT performed immediately prior to start of procedure:  I, Delmer Castaneda MD, have performed the following reviews on Marva Gonzalez prior to the start of the procedure:            * Patient was identified by name and date of birth   * Agreement on procedure being performed was verified  * Risks and Benefits explained to the patient  * Procedure site verified and marked as necessary  * Patient was positioned for comfort  * Consent was obtained     Time: 3:16 PM     Date of procedure: 5/19/2025    Procedure performed by:  Delmer Castaneda MD    Ms. Maryellen Gonzalez tolerated the procedure well with no complications      ICD-10-CM    1. Unilateral primary osteoarthritis, left knee  M17.12 DRAIN/INJECT LARGE JOINT/BURSA     sodium hyaluronate (EUFLEXXA, HYALGAN) injection 20 mg      2. Unilateral primary osteoarthritis, right knee  M17.11 DRAIN/INJECT LARGE JOINT/BURSA     sodium hyaluronate (EUFLEXXA, HYALGAN) injection 20 mg        PLAN: Ms. Maryellen Nix knees injected with 2 cc of Euflexxa. Ms. Maryellen Gonzalez will follow up PRN now that she has completed her visco supplementation injection series.     Documentation by walter Elias, as documented by Delmer Castaneda MD.

## 2025-05-19 ENCOUNTER — OFFICE VISIT (OUTPATIENT)
Age: 80
End: 2025-05-19
Payer: MEDICARE

## 2025-05-19 DIAGNOSIS — M17.12 UNILATERAL PRIMARY OSTEOARTHRITIS, LEFT KNEE: Primary | ICD-10-CM

## 2025-05-19 DIAGNOSIS — M17.11 UNILATERAL PRIMARY OSTEOARTHRITIS, RIGHT KNEE: ICD-10-CM

## 2025-05-19 PROCEDURE — 20610 DRAIN/INJ JOINT/BURSA W/O US: CPT | Performed by: SPECIALIST

## 2025-06-26 ENCOUNTER — OFFICE VISIT (OUTPATIENT)
Age: 80
End: 2025-06-26
Payer: MEDICARE

## 2025-06-26 VITALS
SYSTOLIC BLOOD PRESSURE: 128 MMHG | HEIGHT: 66 IN | WEIGHT: 218 LBS | DIASTOLIC BLOOD PRESSURE: 84 MMHG | HEART RATE: 80 BPM | BODY MASS INDEX: 35.03 KG/M2 | OXYGEN SATURATION: 94 %

## 2025-06-26 DIAGNOSIS — I10 ESSENTIAL (PRIMARY) HYPERTENSION: ICD-10-CM

## 2025-06-26 DIAGNOSIS — G47.33 OBSTRUCTIVE SLEEP APNEA (ADULT) (PEDIATRIC): ICD-10-CM

## 2025-06-26 DIAGNOSIS — I42.9 CARDIOMYOPATHY, UNSPECIFIED TYPE (HCC): Primary | ICD-10-CM

## 2025-06-26 DIAGNOSIS — E11.9 TYPE 2 DIABETES MELLITUS WITHOUT COMPLICATION, WITH LONG-TERM CURRENT USE OF INSULIN (HCC): ICD-10-CM

## 2025-06-26 DIAGNOSIS — Z79.4 TYPE 2 DIABETES MELLITUS WITHOUT COMPLICATION, WITH LONG-TERM CURRENT USE OF INSULIN (HCC): ICD-10-CM

## 2025-06-26 DIAGNOSIS — E78.49 OTHER HYPERLIPIDEMIA: ICD-10-CM

## 2025-06-26 DIAGNOSIS — N18.30 STAGE 3 CHRONIC KIDNEY DISEASE, UNSPECIFIED WHETHER STAGE 3A OR 3B CKD (HCC): ICD-10-CM

## 2025-06-26 PROCEDURE — G8417 CALC BMI ABV UP PARAM F/U: HCPCS | Performed by: INTERNAL MEDICINE

## 2025-06-26 PROCEDURE — 93000 ELECTROCARDIOGRAM COMPLETE: CPT | Performed by: INTERNAL MEDICINE

## 2025-06-26 PROCEDURE — 1160F RVW MEDS BY RX/DR IN RCRD: CPT | Performed by: INTERNAL MEDICINE

## 2025-06-26 PROCEDURE — 99214 OFFICE O/P EST MOD 30 MIN: CPT | Performed by: INTERNAL MEDICINE

## 2025-06-26 PROCEDURE — 1123F ACP DISCUSS/DSCN MKR DOCD: CPT | Performed by: INTERNAL MEDICINE

## 2025-06-26 PROCEDURE — 1126F AMNT PAIN NOTED NONE PRSNT: CPT | Performed by: INTERNAL MEDICINE

## 2025-06-26 PROCEDURE — 1036F TOBACCO NON-USER: CPT | Performed by: INTERNAL MEDICINE

## 2025-06-26 PROCEDURE — G8427 DOCREV CUR MEDS BY ELIG CLIN: HCPCS | Performed by: INTERNAL MEDICINE

## 2025-06-26 PROCEDURE — 1159F MED LIST DOCD IN RCRD: CPT | Performed by: INTERNAL MEDICINE

## 2025-06-26 PROCEDURE — 3079F DIAST BP 80-89 MM HG: CPT | Performed by: INTERNAL MEDICINE

## 2025-06-26 PROCEDURE — 3074F SYST BP LT 130 MM HG: CPT | Performed by: INTERNAL MEDICINE

## 2025-06-26 PROCEDURE — G8399 PT W/DXA RESULTS DOCUMENT: HCPCS | Performed by: INTERNAL MEDICINE

## 2025-06-26 PROCEDURE — 1090F PRES/ABSN URINE INCON ASSESS: CPT | Performed by: INTERNAL MEDICINE

## 2025-06-26 RX ORDER — ASPIRIN 81 MG/1
81 TABLET ORAL DAILY
COMMUNITY

## 2025-06-26 ASSESSMENT — ENCOUNTER SYMPTOMS
COUGH: 0
ABDOMINAL DISTENTION: 0
SORE THROAT: 0
ABDOMINAL PAIN: 0
NAUSEA: 0
VOMITING: 0
SHORTNESS OF BREATH: 0

## 2025-06-26 ASSESSMENT — PATIENT HEALTH QUESTIONNAIRE - PHQ9
SUM OF ALL RESPONSES TO PHQ QUESTIONS 1-9: 0
SUM OF ALL RESPONSES TO PHQ QUESTIONS 1-9: 0
2. FEELING DOWN, DEPRESSED OR HOPELESS: NOT AT ALL
SUM OF ALL RESPONSES TO PHQ QUESTIONS 1-9: 0
1. LITTLE INTEREST OR PLEASURE IN DOING THINGS: NOT AT ALL
SUM OF ALL RESPONSES TO PHQ QUESTIONS 1-9: 0

## 2025-06-26 NOTE — PROGRESS NOTES
Marva Gonzalez presents today for   Chief Complaint   Patient presents with    Follow-up     6 month       Marva Gonzalez preferred language for health care discussion is english/other.    Is someone accompanying this pt? no    Is the patient using any DME equipment during OV? no    Depression Screening:  Depression: Not at risk (6/26/2025)    PHQ-2     PHQ-2 Score: 0        Learning Assessment:  Who is the primary learner? Patient    What is the preferred language for health care of the primary learner? ENGLISH    How does the primary learner prefer to learn new concepts? DEMONSTRATION    Answered By patient    Relationship to Learner SELF           Pt currently taking Anticoagulant therapy? no    Pt currently taking Antiplatelet therapy ? Asprin 81 mg daily      Coordination of Care:  1. Have you been to the ER, urgent care clinic since your last visit? Hospitalized since your last visit? no    2. Have you seen or consulted any other health care providers outside of the [facility] since your last visit? Include any pap smears or colon screening. no

## 2025-06-26 NOTE — PROGRESS NOTES
06/26/25     Marva Gonzalez  is a 80 y.o. female     Chief Complaint   Patient presents with    Follow-up     6 month       HPI    Patient presents for a follow-up office visit.  She has a significant risk factors including a strong family history for premature CAD, essential hypertension, dyslipidemia, and type 2 diabetes.    She was initially self-referred for evaluation of shortness of breath which she feels is become progressively worse primarily with exertion.  She denies any exertional chest pain or pressure.  She does have a history of a traumatic DVT of her lower extremity at the beginning of 2019 which was treated with 3 months of oral anticoagulation.  Since that time, she had a follow-up venous duplex scan which was negative for DVT.  He does get occasional swelling at the end of the day but this will always improve at night or if she elevates her legs.  She denies any orthopnea or PND.  No heart palpitations, dizziness nor syncope.   She states her weight has been very stable over the past 5 years.  She was diagnosed with obstructive sleep apnea many years ago, but cannot tolerate a CPAP facemask and also could not tolerate an oral device.    She underwent a follow-up echocardiogram in April 2021 which was a limited but technically difficult study.  It did appear that her LV function was mildly depressed, EF 45-50% with global hypokinesis.      She recently underwent a right and left heart catheterization November 2021.  She had no evidence of pulmonary hypertension.  Her right-sided heart pressures were all normal.  Her LVEDP was 10 mmHg, her coronary anatomy was normal.  Her cardiac index was between 2.0 and 2.1.  She underwent a follow-up echocardiogram in January 2023 which is unchanged compared to her previous study.  EF 45-50%, moderate concentric LVH with global hypokinesis.    The patient was last seen in our office 6 months ago.  Since last visit, she states her oncologist stopped her

## 2025-07-15 NOTE — PROGRESS NOTES
Patient: Marva Gonzalez                MRN: [id number]       SSN: [id number]  YOB: 1945        AGE: 80 y.o.        SEX: female      PCP: Kylee Velez MD  07/17/25    Chief Complaint   Patient presents with    Knee Pain     Bilateral     Back Pain     HISTORY:  Marva Gonzalez is a 80 y.o. female who is seen for increased lower back and bilateral knee pains. No history of recent back injury. She previously completed a course of PT but it did not help much. She responded to a bilateral paralumbar muscle trigger injection last OV but her lower back pain has returned. She has seen Dr. Nevarez for her back pain in the past. She is not interested in back surgery.  She relates her lower back pain to helping [employer] after his assistant fell off a treadmill and broke her shoulder. She does not want to have any back surgery.      Patient successfully completed a bilateral Euflexxa series on 9/16/24 and 5/19/25 but her knee pains have returned. She feels knee pain with standing, walking and stair climbing.  She experiences startup pain after sitting.     She notes she has been getting dizzy recently. She feels like she is going to fall over when she is getting her laundry out of the dryer. She has not been passing out.      She sustained lower extremity injuries on 1/15/19. She states that she missed a step while at a restaurant in [place]. Duplex ultrasound was positive for acute occlusive thrombus present in one of two left peroneal veins. She was seen  by Dr. Edge who started her on Eliquis.      Occupation, etc: Ms. Maryellen Gonzalez is retired. 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 She lives with her niece and nephew in [place]. She has a daughter in [place], a son in [place] and another son in [place] who works for the [employer]. Her son previously worked nuclear security in [place]. Her son in [place]

## 2025-07-17 ENCOUNTER — OFFICE VISIT (OUTPATIENT)
Age: 80
End: 2025-07-17

## 2025-07-17 VITALS — BODY MASS INDEX: 35.99 KG/M2 | WEIGHT: 223 LBS

## 2025-07-17 DIAGNOSIS — G89.29 CHRONIC BILATERAL LOW BACK PAIN, UNSPECIFIED WHETHER SCIATICA PRESENT: ICD-10-CM

## 2025-07-17 DIAGNOSIS — G89.29 CHRONIC PAIN OF RIGHT KNEE: ICD-10-CM

## 2025-07-17 DIAGNOSIS — M25.561 CHRONIC PAIN OF RIGHT KNEE: ICD-10-CM

## 2025-07-17 DIAGNOSIS — M25.562 CHRONIC PAIN OF LEFT KNEE: ICD-10-CM

## 2025-07-17 DIAGNOSIS — M54.50 LUMBAR PAIN: ICD-10-CM

## 2025-07-17 DIAGNOSIS — M54.16 LUMBAR RADICULOPATHY: ICD-10-CM

## 2025-07-17 DIAGNOSIS — G89.29 CHRONIC PAIN OF LEFT KNEE: ICD-10-CM

## 2025-07-17 DIAGNOSIS — M17.12 UNILATERAL PRIMARY OSTEOARTHRITIS, LEFT KNEE: Primary | ICD-10-CM

## 2025-07-17 DIAGNOSIS — M79.10 MYALGIA: ICD-10-CM

## 2025-07-17 DIAGNOSIS — M17.11 UNILATERAL PRIMARY OSTEOARTHRITIS, RIGHT KNEE: ICD-10-CM

## 2025-07-17 DIAGNOSIS — M54.50 CHRONIC BILATERAL LOW BACK PAIN, UNSPECIFIED WHETHER SCIATICA PRESENT: ICD-10-CM

## 2025-07-17 RX ORDER — BUPIVACAINE HYDROCHLORIDE 5 MG/ML
4 INJECTION, SOLUTION PERINEURAL ONCE
Status: COMPLETED | OUTPATIENT
Start: 2025-07-17 | End: 2025-07-17

## 2025-07-17 RX ORDER — BETAMETHASONE SODIUM PHOSPHATE AND BETAMETHASONE ACETATE 3; 3 MG/ML; MG/ML
3 INJECTION, SUSPENSION INTRA-ARTICULAR; INTRALESIONAL; INTRAMUSCULAR; SOFT TISSUE ONCE
Status: COMPLETED | OUTPATIENT
Start: 2025-07-17 | End: 2025-07-17

## 2025-07-17 RX ADMIN — BUPIVACAINE HYDROCHLORIDE 20 MG: 5 INJECTION, SOLUTION PERINEURAL at 11:12

## 2025-07-17 RX ADMIN — BETAMETHASONE SODIUM PHOSPHATE AND BETAMETHASONE ACETATE 3 MG: 3; 3 INJECTION, SUSPENSION INTRA-ARTICULAR; INTRALESIONAL; INTRAMUSCULAR; SOFT TISSUE at 11:10

## 2025-07-17 RX ADMIN — BETAMETHASONE SODIUM PHOSPHATE AND BETAMETHASONE ACETATE 3 MG: 3; 3 INJECTION, SUSPENSION INTRA-ARTICULAR; INTRALESIONAL; INTRAMUSCULAR; SOFT TISSUE at 11:11
